# Patient Record
Sex: FEMALE | Race: OTHER | Employment: OTHER | URBAN - METROPOLITAN AREA
[De-identification: names, ages, dates, MRNs, and addresses within clinical notes are randomized per-mention and may not be internally consistent; named-entity substitution may affect disease eponyms.]

---

## 2018-06-13 PROBLEM — E78.49 OTHER HYPERLIPIDEMIA: Status: ACTIVE | Noted: 2018-06-13

## 2018-06-13 PROBLEM — F32.4 MAJOR DEPRESSIVE DISORDER IN PARTIAL REMISSION, UNSPECIFIED WHETHER RECURRENT: Status: ACTIVE | Noted: 2018-06-13

## 2018-06-13 PROBLEM — Z98.890 HISTORY OF CARDIAC CATH: Status: ACTIVE | Noted: 2018-06-13

## 2018-06-13 PROBLEM — F41.1 GAD (GENERALIZED ANXIETY DISORDER): Status: ACTIVE | Noted: 2018-06-13

## 2018-06-13 PROBLEM — I25.10 ASHD (ARTERIOSCLEROTIC HEART DISEASE): Status: ACTIVE | Noted: 2018-06-13

## 2018-06-13 PROBLEM — F32.4 MAJOR DEPRESSIVE DISORDER IN PARTIAL REMISSION, UNSPECIFIED WHETHER RECURRENT (HCC): Status: ACTIVE | Noted: 2018-06-13

## 2018-06-13 PROBLEM — R73.03 PREDIABETES: Status: ACTIVE | Noted: 2018-06-13

## 2018-06-25 ENCOUNTER — OFFICE VISIT (OUTPATIENT)
Dept: FAMILY MEDICINE CLINIC | Facility: CLINIC | Age: 73
End: 2018-06-25

## 2018-06-25 VITALS
HEART RATE: 44 BPM | SYSTOLIC BLOOD PRESSURE: 153 MMHG | HEIGHT: 63 IN | WEIGHT: 182 LBS | DIASTOLIC BLOOD PRESSURE: 67 MMHG | TEMPERATURE: 98 F | BODY MASS INDEX: 32.25 KG/M2

## 2018-06-25 DIAGNOSIS — K64.4 HEMORRHOIDS, EXTERNAL: ICD-10-CM

## 2018-06-25 DIAGNOSIS — I50.9 CHRONIC CONGESTIVE HEART FAILURE, UNSPECIFIED HEART FAILURE TYPE (HCC): ICD-10-CM

## 2018-06-25 DIAGNOSIS — I25.10 CORONARY ARTERY DISEASE WITHOUT ANGINA PECTORIS, UNSPECIFIED VESSEL OR LESION TYPE, UNSPECIFIED WHETHER NATIVE OR TRANSPLANTED HEART: Primary | ICD-10-CM

## 2018-06-25 DIAGNOSIS — Z12.11 COLON CANCER SCREENING: ICD-10-CM

## 2018-06-25 DIAGNOSIS — R73.03 PREDIABETES: ICD-10-CM

## 2018-06-25 PROCEDURE — 99203 OFFICE O/P NEW LOW 30 MIN: CPT | Performed by: FAMILY MEDICINE

## 2018-06-25 PROCEDURE — 99212 OFFICE O/P EST SF 10 MIN: CPT | Performed by: FAMILY MEDICINE

## 2018-06-25 RX ORDER — FUROSEMIDE 20 MG/1
20 TABLET ORAL 2 TIMES DAILY
Qty: 30 TABLET | Refills: 1 | Status: SHIPPED | OUTPATIENT
Start: 2018-06-25 | End: 2021-05-14

## 2018-06-25 NOTE — PROGRESS NOTES
6/25/2018  3:10 PM    Jericho Lewis is a 68year old female. Chief complaint(s): Patient presents with:  Anal Problem (GI)    HPI:     Jericho Lewis primary complaint is regarding multiple complaints. Patient presents with CAD.   she is here toda Medications (Active prior to today's visit):    Current Outpatient Prescriptions:  hydrocortisone (ANUSOL-HC) 2.5 % Rectal Cream Place 1 Application rectally 2 (two) times daily.  Disp: 30 g Rfl: 1   Psyllium 58.6 % Oral Powder Take 2 tablespoons in 6 o with a grade of 2/6   Pulmonary/Chest: Effort normal and breath sounds normal. She has no wheezes. She has no rales. Abdominal: Soft. Normal appearance and bowel sounds are normal. There is no tenderness.    Genitourinary: Rectal exam shows internal hemor Disp: , Rfl:            LABORATORY & ORDERS:   Orders Placed This Encounter      **CMP  Comp Metabolic Panel (14) [E]      **CBC W Differential W Platelet [E]      TSH W Reflex To Free T4 [E]    REFERRALS: 1GASTRO - INTERNAL  CARD ECHO 2D DOPPLER CONTRAST This Visit:    Signed Prescriptions Disp Refills    hydrocortisone (ANUSOL-HC) 2.5 % Rectal Cream 30 g 1      Sig: Place 1 Application rectally 2 (two) times daily.       Psyllium 58.6 % Oral Powder 660 g 7      Sig: Take 2 tablespoons in 6 oz of water at n

## 2018-06-29 ENCOUNTER — TELEPHONE (OUTPATIENT)
Dept: FAMILY MEDICINE CLINIC | Facility: CLINIC | Age: 73
End: 2018-06-29

## 2018-06-29 NOTE — TELEPHONE ENCOUNTER
Dr Saima Burgos see pt refill request below. Metoprolol is listed as historical on MAR and \"opendas\" \"Idapatan\" are not listed on MAR. Please advise.

## 2018-06-29 NOTE — TELEPHONE ENCOUNTER
Pt called requesting refill on medications that she received in [place]      Current Outpatient Prescriptions:  Metoprolol Tartrate 100 MG Oral Tab Take 100 mg by mouth daily.  Disp:  Rfl:        opendas 40mg  idapatan 35mg

## 2018-07-01 RX ORDER — METOPROLOL TARTRATE 100 MG/1
100 TABLET ORAL DAILY
Qty: 90 TABLET | Refills: 2 | Status: SHIPPED | OUTPATIENT
Start: 2018-07-01 | End: 2020-10-15

## 2020-09-23 ENCOUNTER — NURSE TRIAGE (OUTPATIENT)
Dept: FAMILY MEDICINE CLINIC | Facility: CLINIC | Age: 75
End: 2020-09-23

## 2020-09-23 NOTE — TELEPHONE ENCOUNTER
Action Requested: Summary for Provider     []  Critical Lab, Recommendations Needed  [] Need Additional Advice  []   FYI    []   Need Orders  [] Need Medications Sent to Pharmacy  []  Other     SUMMARY: language line #[phone] assisted with the phone call.

## 2020-09-28 ENCOUNTER — TELEPHONE (OUTPATIENT)
Dept: FAMILY MEDICINE CLINIC | Facility: CLINIC | Age: 75
End: 2020-09-28

## 2020-09-28 NOTE — TELEPHONE ENCOUNTER
Granddaughter states redness on skin near Ostomy site and wanting to schedule an appointment with Dr. Asuncion Verdugo.   Scheduled patient for tomorrow 9/29 at 10:00 am.

## 2020-09-29 ENCOUNTER — OFFICE VISIT (OUTPATIENT)
Dept: FAMILY MEDICINE CLINIC | Facility: CLINIC | Age: 75
End: 2020-09-29
Payer: COMMERCIAL

## 2020-09-29 VITALS
SYSTOLIC BLOOD PRESSURE: 149 MMHG | DIASTOLIC BLOOD PRESSURE: 69 MMHG | BODY MASS INDEX: 29.77 KG/M2 | TEMPERATURE: 97 F | HEIGHT: 63 IN | HEART RATE: 61 BPM | WEIGHT: 168 LBS

## 2020-09-29 DIAGNOSIS — Z85.048 HISTORY OF RECTAL CANCER: ICD-10-CM

## 2020-09-29 DIAGNOSIS — N39.0 RECURRENT UTI: ICD-10-CM

## 2020-09-29 DIAGNOSIS — Z43.3 COLOSTOMY CARE (HCC): ICD-10-CM

## 2020-09-29 DIAGNOSIS — R30.0 DYSURIA: ICD-10-CM

## 2020-09-29 DIAGNOSIS — Z76.89 ENCOUNTER TO ESTABLISH CARE: Primary | ICD-10-CM

## 2020-09-29 PROBLEM — R73.03 PREDIABETES: Status: RESOLVED | Noted: 2018-06-13 | Resolved: 2020-09-29

## 2020-09-29 PROBLEM — I25.119 CORONARY ARTERY DISEASE INVOLVING NATIVE CORONARY ARTERY OF NATIVE HEART WITH ANGINA PECTORIS (HCC): Status: ACTIVE | Noted: 2018-06-13

## 2020-09-29 PROBLEM — I25.119 CORONARY ARTERY DISEASE INVOLVING NATIVE CORONARY ARTERY OF NATIVE HEART WITH ANGINA PECTORIS: Status: ACTIVE | Noted: 2018-06-13

## 2020-09-29 PROCEDURE — 99214 OFFICE O/P EST MOD 30 MIN: CPT | Performed by: FAMILY MEDICINE

## 2020-09-29 PROCEDURE — G0463 HOSPITAL OUTPT CLINIC VISIT: HCPCS | Performed by: FAMILY MEDICINE

## 2020-09-29 PROCEDURE — 3008F BODY MASS INDEX DOCD: CPT | Performed by: FAMILY MEDICINE

## 2020-09-29 PROCEDURE — 3078F DIAST BP <80 MM HG: CPT | Performed by: FAMILY MEDICINE

## 2020-09-29 PROCEDURE — 3077F SYST BP >= 140 MM HG: CPT | Performed by: FAMILY MEDICINE

## 2020-09-29 PROCEDURE — 81002 URINALYSIS NONAUTO W/O SCOPE: CPT | Performed by: FAMILY MEDICINE

## 2020-09-29 RX ORDER — KARAYA GUM
POWDER (GRAM) TOPICAL
Refills: 0 | Status: CANCELLED | OUTPATIENT
Start: 2020-09-29

## 2020-09-29 RX ORDER — LOSARTAN POTASSIUM 50 MG/1
TABLET ORAL DAILY
COMMUNITY
End: 2020-10-20

## 2020-10-01 PROBLEM — N39.0 RECURRENT UTI: Status: ACTIVE | Noted: 2020-10-01

## 2020-10-01 PROBLEM — Z85.048 HISTORY OF RECTAL CANCER: Status: ACTIVE | Noted: 2020-10-01

## 2020-10-01 PROBLEM — Z43.3 COLOSTOMY CARE (HCC): Status: ACTIVE | Noted: 2020-10-01

## 2020-10-01 RX ORDER — NITROFURANTOIN 25; 75 MG/1; MG/1
100 CAPSULE ORAL 2 TIMES DAILY
Qty: 10 CAPSULE | Refills: 0 | Status: SHIPPED | OUTPATIENT
Start: 2020-10-01 | End: 2020-10-01

## 2020-10-01 RX ORDER — CIPROFLOXACIN 500 MG/1
500 TABLET, FILM COATED ORAL 2 TIMES DAILY
Qty: 14 TABLET | Refills: 0 | Status: SHIPPED | OUTPATIENT
Start: 2020-10-01 | End: 2020-10-08

## 2020-10-01 NOTE — PROGRESS NOTES
HPI:   Giovanni Neal is a 76year old female who presents for an establish care visit. Patient recently moved to [facility] from [facility] where she was living with her son.   Her son passed away recently and had to move back to [facility] to  Hemorrhoid    • History of cardiac cath 6/13/2018   • Hyperlipidemia       Past Surgical History:   Procedure Laterality Date   • ANGIOPLASTY (CORONARY)     • CATARACT     • HYSTERECTOMY        History reviewed. No pertinent family history.    Social Histor infections. - OP REFERRAL TO UROGYNECOLOGY CLINIC    4.  Colostomy care [facility])  - -Patient was getting ostomy supplies, lubricating deodorant, barrier, cohesive stoma, stoma powder, drainable pouches with filter and adhesive remover wipes from [facility]

## 2020-10-12 ENCOUNTER — TELEPHONE (OUTPATIENT)
Dept: FAMILY MEDICINE CLINIC | Facility: CLINIC | Age: 75
End: 2020-10-12

## 2020-10-12 DIAGNOSIS — N39.0 RECURRENT UTI: Primary | ICD-10-CM

## 2020-10-12 NOTE — TELEPHONE ENCOUNTER
Bahamian speaking - pt said that referral for urogynecologist Inocente Brower does not take her insurance.  Pt requests another referral to see . Please advise

## 2020-10-15 ENCOUNTER — OFFICE VISIT (OUTPATIENT)
Dept: FAMILY MEDICINE CLINIC | Facility: CLINIC | Age: 75
End: 2020-10-15
Payer: MEDICARE

## 2020-10-15 ENCOUNTER — LAB ENCOUNTER (OUTPATIENT)
Dept: LAB | Age: 75
End: 2020-10-15
Attending: FAMILY MEDICINE
Payer: MEDICARE

## 2020-10-15 VITALS
HEIGHT: 63 IN | HEART RATE: 60 BPM | RESPIRATION RATE: 18 BRPM | TEMPERATURE: 98 F | BODY MASS INDEX: 29.41 KG/M2 | SYSTOLIC BLOOD PRESSURE: 136 MMHG | DIASTOLIC BLOOD PRESSURE: 78 MMHG | WEIGHT: 166 LBS

## 2020-10-15 DIAGNOSIS — Z85.038 HISTORY OF COLON CANCER: Primary | ICD-10-CM

## 2020-10-15 DIAGNOSIS — R10.84 GENERALIZED ABDOMINAL PAIN: ICD-10-CM

## 2020-10-15 DIAGNOSIS — I10 ESSENTIAL HYPERTENSION: ICD-10-CM

## 2020-10-15 DIAGNOSIS — Z85.038 HISTORY OF COLON CANCER: ICD-10-CM

## 2020-10-15 DIAGNOSIS — N30.00 ACUTE CYSTITIS WITHOUT HEMATURIA: ICD-10-CM

## 2020-10-15 PROCEDURE — 99213 OFFICE O/P EST LOW 20 MIN: CPT | Performed by: FAMILY MEDICINE

## 2020-10-15 PROCEDURE — 80053 COMPREHEN METABOLIC PANEL: CPT

## 2020-10-15 PROCEDURE — 3078F DIAST BP <80 MM HG: CPT | Performed by: FAMILY MEDICINE

## 2020-10-15 PROCEDURE — 81003 URINALYSIS AUTO W/O SCOPE: CPT | Performed by: FAMILY MEDICINE

## 2020-10-15 PROCEDURE — 3075F SYST BP GE 130 - 139MM HG: CPT | Performed by: FAMILY MEDICINE

## 2020-10-15 PROCEDURE — 3008F BODY MASS INDEX DOCD: CPT | Performed by: FAMILY MEDICINE

## 2020-10-15 PROCEDURE — 82378 CARCINOEMBRYONIC ANTIGEN: CPT | Performed by: FAMILY MEDICINE

## 2020-10-15 PROCEDURE — 85025 COMPLETE CBC W/AUTO DIFF WBC: CPT

## 2020-10-15 PROCEDURE — 36415 COLL VENOUS BLD VENIPUNCTURE: CPT

## 2020-10-15 PROCEDURE — G0463 HOSPITAL OUTPT CLINIC VISIT: HCPCS | Performed by: FAMILY MEDICINE

## 2020-10-15 NOTE — PROGRESS NOTES
10/15/2020  1:27 PM    Aldon Riedel is a 76year old female. Chief complaint(s): Patient presents with: Follow - Up: abd pain. states has improved very mildly. worse after earing.     HPI:     Aldon Riedel primary complaint is regarding multiple c • hydrocortisone (ANUSOL-HC) 2.5 % Rectal Cream Place 1 Application rectally 2 (two) times daily. (Patient not taking: Reported on 9/29/2020 ) 30 g 1   • Psyllium 58.6 % Oral Powder Take 2 tablespoons in 6 oz of water at nightly.  (Patient not taking: Repor Urobilinogen Urine 0.2 0.0 - 1.9 mg/dL    Nitrite Urine Neg Negative    Leukocyte Esterase Urine Neg Negative    APPEARANCE clear Clear    Color Urine yellow Yellow    Multistix Lot# [id number] Numeric    Multistix Expiration Date 6/30/21 Date       EKG / Spir

## 2020-10-15 NOTE — TELEPHONE ENCOUNTER
Please inform patient that I placed a referral to urogynecology that appears to be within network, can provide with referral information.

## 2020-10-19 ENCOUNTER — TELEPHONE (OUTPATIENT)
Dept: FAMILY MEDICINE CLINIC | Facility: CLINIC | Age: 75
End: 2020-10-19

## 2020-10-19 DIAGNOSIS — I10 ESSENTIAL HYPERTENSION: Primary | ICD-10-CM

## 2020-10-19 NOTE — TELEPHONE ENCOUNTER
Patient is requesting a refill for losartan 50mg. Per patient, medication was being prescribed by her previous doctor in [place]. Please advise.

## 2020-10-20 ENCOUNTER — TELEPHONE (OUTPATIENT)
Dept: FAMILY MEDICINE CLINIC | Facility: CLINIC | Age: 75
End: 2020-10-20

## 2020-10-20 RX ORDER — LOSARTAN POTASSIUM 50 MG/1
50 TABLET ORAL DAILY
Qty: 90 TABLET | Refills: 1 | Status: SHIPPED | OUTPATIENT
Start: 2020-10-20 | End: 2020-10-29

## 2020-10-21 NOTE — TELEPHONE ENCOUNTER
Message # [phone]         10/20/2020 09:02p   [TABATHAP]  To:  From:  DIAZ Sarkar MD:  Phone#:    Vicky Vega, [address],  [address], RE PT NOT FEELING WELL, ONLY SPEAKS Nauruan Paged at number :  PAGE:  [phone] at :  [facility] [employer].

## 2020-10-22 ENCOUNTER — HOSPITAL ENCOUNTER (OUTPATIENT)
Dept: CT IMAGING | Facility: HOSPITAL | Age: 75
Discharge: HOME | End: 2020-10-22
Attending: FAMILY MEDICINE
Payer: MEDICARE

## 2020-10-22 DIAGNOSIS — R10.84 GENERALIZED ABDOMINAL PAIN: ICD-10-CM

## 2020-10-22 DIAGNOSIS — Z85.038 HISTORY OF COLON CANCER: ICD-10-CM

## 2020-10-22 PROCEDURE — 74177 CT ABD & PELVIS W/CONTRAST: CPT | Performed by: FAMILY MEDICINE

## 2020-10-29 ENCOUNTER — OFFICE VISIT (OUTPATIENT)
Dept: FAMILY MEDICINE CLINIC | Facility: CLINIC | Age: 75
End: 2020-10-29
Payer: MEDICAID

## 2020-10-29 ENCOUNTER — TELEPHONE (OUTPATIENT)
Dept: FAMILY MEDICINE CLINIC | Facility: CLINIC | Age: 75
End: 2020-10-29

## 2020-10-29 VITALS
SYSTOLIC BLOOD PRESSURE: 152 MMHG | HEART RATE: 73 BPM | HEIGHT: 63 IN | DIASTOLIC BLOOD PRESSURE: 83 MMHG | WEIGHT: 166 LBS | BODY MASS INDEX: 29.41 KG/M2

## 2020-10-29 DIAGNOSIS — K57.90 DIVERTICULOSIS: ICD-10-CM

## 2020-10-29 DIAGNOSIS — I10 ESSENTIAL HYPERTENSION: ICD-10-CM

## 2020-10-29 DIAGNOSIS — Z85.038 HISTORY OF COLON CANCER: Primary | ICD-10-CM

## 2020-10-29 DIAGNOSIS — N95.2 VAGINAL ATROPHY: ICD-10-CM

## 2020-10-29 DIAGNOSIS — K86.2 PANCREATIC CYST: ICD-10-CM

## 2020-10-29 PROCEDURE — 3077F SYST BP >= 140 MM HG: CPT | Performed by: FAMILY MEDICINE

## 2020-10-29 PROCEDURE — 90662 IIV NO PRSV INCREASED AG IM: CPT | Performed by: FAMILY MEDICINE

## 2020-10-29 PROCEDURE — 3008F BODY MASS INDEX DOCD: CPT | Performed by: FAMILY MEDICINE

## 2020-10-29 PROCEDURE — G0008 ADMIN INFLUENZA VIRUS VAC: HCPCS | Performed by: FAMILY MEDICINE

## 2020-10-29 PROCEDURE — 3079F DIAST BP 80-89 MM HG: CPT | Performed by: FAMILY MEDICINE

## 2020-10-29 PROCEDURE — 99213 OFFICE O/P EST LOW 20 MIN: CPT | Performed by: FAMILY MEDICINE

## 2020-10-29 RX ORDER — ESTRADIOL 0.1 MG/G
1 CREAM VAGINAL DAILY
Qty: 42 G | Refills: 2 | Status: SHIPPED | OUTPATIENT
Start: 2020-10-29 | End: 2020-10-29

## 2020-10-29 RX ORDER — LOSARTAN POTASSIUM 100 MG/1
100 TABLET ORAL DAILY
Qty: 90 TABLET | Refills: 1 | Status: SHIPPED | OUTPATIENT
Start: 2020-10-29

## 2020-10-29 NOTE — PROGRESS NOTES
10/29/2020  11:42 AM    Sukhdeep Conteh is a 76year old female. Chief complaint(s): Patient presents with:  Burn: vaginal burning sensation  Hx colon cancer  HPI:     Sukhdeep Conteh primary complaint is regarding as above.      Patient is a 76 -year-ol 23          01/30/2013      Medications (Active prior to today's visit):  Current Outpatient Medications   Medication Sig Dispense Refill   • Estradiol (ESTRACE) 0.1 MG/GM Vaginal Cream Place 1 g vaginally daily.  42 g 2   • losartan Potassium 100 MG Oral T Calculated Osmolality 291 275 - 295 mOsm/kg    GFR, Non- 66 >=60    GFR, -American 76 >=60    ALT 15 13 - 56 U/L    AST 16 15 - 37 U/L    Alkaline Phosphatase 49 (L) 55 - 142 U/L    Bilirubin, Total 0.7 0.1 - 2.0 mg/dL    Total Prote heart is normal in size. Coronary artery calcification. Trace bilateral pleural effusions. There is dependent subsegmental atelectasis bilaterally with elevation of the left hemidiaphragm.  LIVER: No enlargement, atrophy, abnormal density, or significant carcinoma. Postsurgical changes of abdominoperineal resection are identified with a left lower quadrant colostomy. Slight nodularity in the presacral space is most likely postprocedural in nature. Moderate colonic fecal burden suggests constipation.   Mi 1 tablet (100 mg total) by mouth daily. LABORATORY & ORDERS: Orders Placed This Encounter      Influenza, High-Dose (Fluzone) [id number]     RECOMMENDATIONS given include: Please, call our office with any questions or concerns.  Notify Dr Malathi Whitaker or

## 2020-10-29 NOTE — TELEPHONE ENCOUNTER
Pharmacy calling and requesting Vaginal Cream clarification . States that this is not for long term use, states that usually once a day for just 2 weeks. Dr Fatou Kellogg above and advise. Pended new prescription for approval good for 2 weeks only.

## 2020-10-30 RX ORDER — ESTRADIOL 0.1 MG/G
1 CREAM VAGINAL
Qty: 1 TUBE | Refills: 0 | Status: SHIPPED | OUTPATIENT
Start: 2020-10-30 | End: 2020-11-13

## 2020-11-08 NOTE — H&P
[address] [facility] [address] Gastroenterology                                                                                                               Reason for consult: h/o colon ca    Requesting physician or provider: Henrique Celaya dilated side branch radical.  Suggest follow-up MRCP for further evaluation.     No jaundice, pruritus, tarik colored stool, dark urine    Labs 10/15/20:  CEA 1.3  CBC not anemic  LFTs nml      NSAIDS:  Tobacco:  Alcohol:  Illicit drugs:    No FH GI malignan Allergies    ROS:   CONSTITUTIONAL: negative for fevers, chills, sweats and weight loss  EYES Negative for red eyes, yellow eyes, changes in vision  HEENT: Negative for dysphagia and hoarseness  RESPIRATORY: Negative for cough and shortness of breath  CARD Eosinophil Absolute 0.03 0.00 - 0.70 x10(3) uL    Basophil Absolute 0.03 0.00 - 0.20 x10(3) uL    Immature Granulocyte Absolute 0.01 0.00 - 1.00 x10(3) uL    Neutrophil % 52.7 %    Lymphocyte % 37.2 %    Monocyte % 8.5 %    Eosinophil % 0.7 %    Basophil % dilated side branch radical. No ductal dilatation. Last lfts nml 10/15/20. She reports various new onset upper gi complaints including epigastric pain that started approx feb and after the death of her son. Symptoms seemingly improved since starting ppi.

## 2020-11-10 ENCOUNTER — OFFICE VISIT (OUTPATIENT)
Dept: GASTROENTEROLOGY | Facility: CLINIC | Age: 75
End: 2020-11-10
Payer: MEDICARE

## 2020-11-10 VITALS
HEIGHT: 63 IN | TEMPERATURE: 98 F | BODY MASS INDEX: 30.3 KG/M2 | WEIGHT: 171 LBS | SYSTOLIC BLOOD PRESSURE: 130 MMHG | DIASTOLIC BLOOD PRESSURE: 88 MMHG | HEART RATE: 76 BPM

## 2020-11-10 DIAGNOSIS — K21.9 GASTROESOPHAGEAL REFLUX DISEASE, UNSPECIFIED WHETHER ESOPHAGITIS PRESENT: ICD-10-CM

## 2020-11-10 DIAGNOSIS — C20 RECTAL CANCER (HCC): ICD-10-CM

## 2020-11-10 DIAGNOSIS — R11.0 NAUSEA: ICD-10-CM

## 2020-11-10 DIAGNOSIS — K86.9 PANCREATIC LESION: Primary | ICD-10-CM

## 2020-11-10 DIAGNOSIS — R10.13 EPIGASTRIC PAIN: ICD-10-CM

## 2020-11-10 DIAGNOSIS — R14.0 BLOATING: ICD-10-CM

## 2020-11-10 PROCEDURE — 3079F DIAST BP 80-89 MM HG: CPT | Performed by: NURSE PRACTITIONER

## 2020-11-10 PROCEDURE — 99214 OFFICE O/P EST MOD 30 MIN: CPT | Performed by: NURSE PRACTITIONER

## 2020-11-10 PROCEDURE — 3075F SYST BP GE 130 - 139MM HG: CPT | Performed by: NURSE PRACTITIONER

## 2020-11-10 PROCEDURE — 3008F BODY MASS INDEX DOCD: CPT | Performed by: NURSE PRACTITIONER

## 2020-11-10 NOTE — PATIENT INSTRUCTIONS
-mri  -continue pantoprazole 40 mg/daily    -you will need a colonoscopy thought your stoma and depending on the results of the mri likely an egd and/or egd/eus to evalaute your upper gi system and pancreas if needed

## 2020-11-13 ENCOUNTER — OFFICE VISIT (OUTPATIENT)
Dept: FAMILY MEDICINE CLINIC | Facility: CLINIC | Age: 75
End: 2020-11-13
Payer: MEDICARE

## 2020-11-13 VITALS
DIASTOLIC BLOOD PRESSURE: 100 MMHG | BODY MASS INDEX: 29.59 KG/M2 | HEIGHT: 63 IN | HEART RATE: 79 BPM | SYSTOLIC BLOOD PRESSURE: 150 MMHG | WEIGHT: 167 LBS

## 2020-11-13 DIAGNOSIS — Z85.038 HISTORY OF COLON CANCER: Primary | ICD-10-CM

## 2020-11-13 DIAGNOSIS — K86.2 PANCREATIC CYST: ICD-10-CM

## 2020-11-13 DIAGNOSIS — N76.0 ACUTE VAGINITIS: ICD-10-CM

## 2020-11-13 PROBLEM — I50.9 CHRONIC CONGESTIVE HEART FAILURE (HCC): Status: ACTIVE | Noted: 2020-11-13

## 2020-11-13 PROCEDURE — 3077F SYST BP >= 140 MM HG: CPT | Performed by: FAMILY MEDICINE

## 2020-11-13 PROCEDURE — 3080F DIAST BP >= 90 MM HG: CPT | Performed by: FAMILY MEDICINE

## 2020-11-13 PROCEDURE — 3008F BODY MASS INDEX DOCD: CPT | Performed by: FAMILY MEDICINE

## 2020-11-13 PROCEDURE — 99213 OFFICE O/P EST LOW 20 MIN: CPT | Performed by: FAMILY MEDICINE

## 2020-11-13 RX ORDER — FLUCONAZOLE 100 MG/1
100 TABLET ORAL DAILY
Qty: 10 TABLET | Refills: 0 | Status: SHIPPED | OUTPATIENT
Start: 2020-11-13 | End: 2021-05-14

## 2020-11-13 RX ORDER — PANTOPRAZOLE SODIUM 40 MG/1
40 TABLET, DELAYED RELEASE ORAL
COMMUNITY
End: 2021-04-23

## 2020-11-13 NOTE — PROGRESS NOTES
11/13/2020  11:19 AM    Feng Wilson is a 76year old female. Chief complaint(s): Patient presents with: Follow - Up  Other: started taking pantoprazole     HPI:     Feng Wilson primary complaint is regarding multiple complaints.      Patient is • Terconazole 0.8 % Vaginal Cream Place 1 applicator vaginally nightly for 3 days. 1 Tube 0   • Estradiol (ESTRACE) 0.1 MG/GM Vaginal Cream Place 1 g vaginally daily as needed.  Use for 2 weeks only 1 Tube 0   • losartan Potassium 100 MG Oral Tab Take 1 tab PROCEDURE: CT ABDOMEN + PELVIS (ALL CONTRAST ONLY) (CPT=74160/68298)  COMPARISON: None. INDICATIONS: History of colon cancer, s/p surgery with colostomy in [place]-2018. Patient c/o intermittent diffuse pain with  constipation.   TECHNIQUE: Multidetector C Pelvic floor laxity is suspected with a small cystocele. VASCULATURE:   No aneurysm is detected. Mild atherosclerosis. RETROPERITONEUM: No mass or lymphadenopathy is apparent.   BONES:   Demineralization with moderate lumbar dextroscoliosis and multilevel 1. History of colon cancer  2. Pancreatic cyst  CPM  Follow with GI to complete work up  RTC after colonoscopy, MRI    3.  Acute vaginitis    MEDICATIONS:     Requested Prescriptions     Signed Prescriptions Disp Refills   • fluconazole (DIFLUCAN) 100 MG Or

## 2020-11-23 ENCOUNTER — TELEPHONE (OUTPATIENT)
Dept: GASTROENTEROLOGY | Facility: CLINIC | Age: 75
End: 2020-11-23

## 2020-11-23 NOTE — TELEPHONE ENCOUNTER
APN contacted pt to follow-up after clinic visit. MRI does not appear to be scheduled yet and needed to determine next steps. Lmtcb.

## 2020-11-30 NOTE — TELEPHONE ENCOUNTER
Pt contacted to follow-up. Used  for call. Needs cln +/- egd vs egd/eus pending mrcp results and imaging not yet scheduled. Again left message for pt to call back.

## 2020-12-14 NOTE — TELEPHONE ENCOUNTER
Attempted to contact using . Spoke with patients son. Patient is in [place] until January. apn explained that pt is in need of mri and colon and testing should be done as soon as possible.   He will let patient know and she will to schedule mri

## 2021-01-27 DIAGNOSIS — Z23 NEED FOR VACCINATION: ICD-10-CM

## 2021-01-28 ENCOUNTER — TELEPHONE (OUTPATIENT)
Dept: FAMILY MEDICINE CLINIC | Facility: CLINIC | Age: 76
End: 2021-01-28

## 2021-01-28 NOTE — TELEPHONE ENCOUNTER
Karon from [facility] called with questions about the referral. She is confused what exactly the home health is for and why it is needed. Please advise. #[phone]. She also mentions they may need the patient to see PCP before.

## 2021-01-28 NOTE — TELEPHONE ENCOUNTER
Lila Martinez states they did get the referral they can start the RN service right away but the Occupational therapy it will be a delayed until February 8 is that ok.         Verbal response is ok

## 2021-02-04 ENCOUNTER — PATIENT MESSAGE (OUTPATIENT)
Dept: CASE MANAGEMENT | Age: 76
End: 2021-02-04

## 2021-02-04 DIAGNOSIS — C18.7 MALIGNANT NEOPLASM OF SIGMOID COLON (HCC): Primary | ICD-10-CM

## 2021-02-05 NOTE — TELEPHONE ENCOUNTER
Karon calling to f/u on status of referral. Stated that she has been waiting for progress notes and order for this pt. Please advise.  Karon marroquin/adelfo #[phone]

## 2021-02-08 ENCOUNTER — TELEPHONE (OUTPATIENT)
Dept: FAMILY MEDICINE CLINIC | Facility: CLINIC | Age: 76
End: 2021-02-08

## 2021-02-08 DIAGNOSIS — Z85.038 HISTORY OF COLON CANCER: Primary | ICD-10-CM

## 2021-02-08 NOTE — TELEPHONE ENCOUNTER
Karon/ Intake Nurse of [facility] is calling regarding referral for home health for nursing and occupational therapy from Dr. Allan Deleon. Karon states diagnosis UTI is not considered valid.  Miriam Martinez also states it is unclear on what skilled need is ne

## 2021-02-08 NOTE — TELEPHONE ENCOUNTER
Dr. Juan Andre do you want home health? If so a new referral is need  1)  Why has the colostomy   Due to ? 2)   Why is she having issues. A new office is also needed for has to be within 90 days. Please also see 1/28/21 encounter.

## 2021-02-08 NOTE — TELEPHONE ENCOUNTER
Can inform [facility] that the referral for home health nurse was for assistance with her stoma care patient with a colostomy and was having issues when I last saw her over 90 days ago. Can follow-up with her PCP if still needed.

## 2021-02-09 NOTE — TELEPHONE ENCOUNTER
Dr. Nae Elkins, Located within [facility] order has been pended. Please review and sign off.

## 2021-02-10 NOTE — TELEPHONE ENCOUNTER
Karon from [facility] returning call. Per Jannet Hernandez the patient needs office visit to address the rectal/ colon cancer as she needs to be seen within 90 days. Whomever the patient see's has to be the one to write the order for home health.  As far as the diagnosis

## 2021-02-10 NOTE — TELEPHONE ENCOUNTER
Patients son, Gilbert Graham on the line. Patient is with her. Gilbert Graham is returning call to verify they are in [facility].  They will contact office when they return next month.     ZACHARY

## 2021-02-16 NOTE — TELEPHONE ENCOUNTER
Karon called to follow up. States she will be discharging patient for now. If patient needs services new orders will be needed.

## 2021-03-01 NOTE — TELEPHONE ENCOUNTER
Nursing:  Patient has not followed up and/or had recommended testing. Can we send her a letter?   ThanksToro

## 2021-03-01 NOTE — TELEPHONE ENCOUNTER
Generated no response letter sent to patient via [employer] and mailed (Regarding MRI MRCP that was never completed).

## 2021-04-02 ENCOUNTER — TELEPHONE (OUTPATIENT)
Dept: CASE MANAGEMENT | Age: 76
End: 2021-04-02

## 2021-04-02 ENCOUNTER — TELEPHONE (OUTPATIENT)
Dept: GASTROENTEROLOGY | Facility: CLINIC | Age: 76
End: 2021-04-02

## 2021-04-02 NOTE — TELEPHONE ENCOUNTER
Patient is eligible for a 2021 Medicare Annual Wellness visit. Language Line  Radha [phone] left msg to call back.

## 2021-04-13 ENCOUNTER — OFFICE VISIT (OUTPATIENT)
Dept: FAMILY MEDICINE CLINIC | Facility: CLINIC | Age: 76
End: 2021-04-13
Payer: MEDICARE

## 2021-04-13 VITALS
HEIGHT: 63 IN | TEMPERATURE: 97 F | HEART RATE: 64 BPM | SYSTOLIC BLOOD PRESSURE: 143 MMHG | DIASTOLIC BLOOD PRESSURE: 92 MMHG | RESPIRATION RATE: 18 BRPM | BODY MASS INDEX: 29.68 KG/M2 | WEIGHT: 167.5 LBS

## 2021-04-13 DIAGNOSIS — F32.1 CURRENT MODERATE EPISODE OF MAJOR DEPRESSIVE DISORDER, UNSPECIFIED WHETHER RECURRENT (HCC): ICD-10-CM

## 2021-04-13 DIAGNOSIS — Z85.038 HISTORY OF COLON CANCER: ICD-10-CM

## 2021-04-13 DIAGNOSIS — C20 RECTAL CANCER (HCC): Primary | ICD-10-CM

## 2021-04-13 PROCEDURE — 99213 OFFICE O/P EST LOW 20 MIN: CPT | Performed by: FAMILY MEDICINE

## 2021-04-13 PROCEDURE — 3077F SYST BP >= 140 MM HG: CPT | Performed by: FAMILY MEDICINE

## 2021-04-13 PROCEDURE — 3080F DIAST BP >= 90 MM HG: CPT | Performed by: FAMILY MEDICINE

## 2021-04-13 PROCEDURE — 3008F BODY MASS INDEX DOCD: CPT | Performed by: FAMILY MEDICINE

## 2021-04-13 RX ORDER — CITALOPRAM 20 MG/1
20 TABLET ORAL DAILY
Qty: 90 TABLET | Refills: 0 | Status: SHIPPED | OUTPATIENT
Start: 2021-04-13

## 2021-04-13 NOTE — PROGRESS NOTES
4/13/2021  1:02 PM    Mali Carr is a 68year old female. Chief complaint(s): Patient presents with:  Abdominal Pain: Pain and bloating by colostomy incision. HPI:     Mali Carr primary complaint is regarding Hx colon cancer.      Patient • ASPIRIN 81 OR Take by mouth. Taking every 3 days     • Citalopram Hydrobromide 20 MG Oral Tab Take 1 tablet (20 mg total) by mouth daily.  90 tablet 0   • Pantoprazole Sodium 40 MG Oral Tab EC Take 40 mg by mouth every morning before breakfast.     • lo Musculoskeletal:      Cervical back: Neck supple. Skin:     Findings: No rash. Psychiatric:         Mood and Affect: Mood is depressed. LABORATORY RESULTS:     EKG / Spirometry : -     Radiology: No results found.      ASSESSMENT/PLAN:   Assessm

## 2021-04-15 ENCOUNTER — TELEPHONE (OUTPATIENT)
Dept: FAMILY MEDICINE CLINIC | Facility: CLINIC | Age: 76
End: 2021-04-15

## 2021-04-15 NOTE — TELEPHONE ENCOUNTER
Patient is calling regarding a referral for gastroenterologist, Dr. Carleen Gordon. Dr. Cheo Hernandez does not have availability until next month and was given the option to schedule with a Nurse Practitioner on 5/17/21.     She took the appointment with Nurse

## 2021-04-16 ENCOUNTER — TELEPHONE (OUTPATIENT)
Dept: GASTROENTEROLOGY | Facility: CLINIC | Age: 76
End: 2021-04-16

## 2021-04-16 NOTE — TELEPHONE ENCOUNTER
D/w Dr. Benedicto Steward - patient very interested in getting colostomy reversed. Will need to see me to discuss c-scope via ostomy. Needs MRI as well as Sarah Beth Meeks ordered.     GI Clinical Staff:   Can you call patient, and have her see me next week 4/23 Fri

## 2021-04-16 NOTE — TELEPHONE ENCOUNTER
Phone call made spoke with Dr Manjinder Couch who will call patient to expedite the visit with him. Also call patient to let her know. Also stressed the importance of getting the MRI done ASAP.

## 2021-04-16 NOTE — TELEPHONE ENCOUNTER
Noted and appreciated.     Future Appointments   Date Time Provider Michelle Mixon   4/23/2021 10:00 AM Robert Wilson MD [facility] [place]

## 2021-04-23 ENCOUNTER — OFFICE VISIT (OUTPATIENT)
Dept: GASTROENTEROLOGY | Facility: CLINIC | Age: 76
End: 2021-04-23
Payer: MEDICARE

## 2021-04-23 ENCOUNTER — TELEPHONE (OUTPATIENT)
Dept: GASTROENTEROLOGY | Facility: CLINIC | Age: 76
End: 2021-04-23

## 2021-04-23 VITALS
HEART RATE: 66 BPM | BODY MASS INDEX: 30.48 KG/M2 | WEIGHT: 172 LBS | DIASTOLIC BLOOD PRESSURE: 90 MMHG | HEIGHT: 63 IN | SYSTOLIC BLOOD PRESSURE: 150 MMHG

## 2021-04-23 DIAGNOSIS — Z85.048 HISTORY OF RECTAL CANCER: Primary | ICD-10-CM

## 2021-04-23 DIAGNOSIS — Z12.11 SCREEN FOR COLON CANCER: Primary | ICD-10-CM

## 2021-04-23 DIAGNOSIS — K21.9 GASTROESOPHAGEAL REFLUX DISEASE WITHOUT ESOPHAGITIS: ICD-10-CM

## 2021-04-23 DIAGNOSIS — R14.0 ABDOMINAL BLOATING: ICD-10-CM

## 2021-04-23 DIAGNOSIS — Z85.038 HISTORY OF COLON CANCER: ICD-10-CM

## 2021-04-23 PROCEDURE — 3080F DIAST BP >= 90 MM HG: CPT | Performed by: INTERNAL MEDICINE

## 2021-04-23 PROCEDURE — 99214 OFFICE O/P EST MOD 30 MIN: CPT | Performed by: INTERNAL MEDICINE

## 2021-04-23 PROCEDURE — 3077F SYST BP >= 140 MM HG: CPT | Performed by: INTERNAL MEDICINE

## 2021-04-23 PROCEDURE — 3008F BODY MASS INDEX DOCD: CPT | Performed by: INTERNAL MEDICINE

## 2021-04-23 RX ORDER — OMEPRAZOLE 20 MG/1
20 CAPSULE, DELAYED RELEASE ORAL EVERY MORNING
Qty: 90 CAPSULE | Refills: 0 | Status: SHIPPED | OUTPATIENT
Start: 2021-04-23 | End: 2021-07-22

## 2021-04-23 RX ORDER — SODIUM, POTASSIUM,MAG SULFATES 17.5-3.13G
SOLUTION, RECONSTITUTED, ORAL ORAL
Qty: 1 BOTTLE | Refills: 0 | Status: SHIPPED | OUTPATIENT
Start: 2021-04-23 | End: 2021-05-14

## 2021-04-23 NOTE — PATIENT INSTRUCTIONS
1. Schedule colonoscopy with MAC [Diagnosis: screening, hx of colon cancer]    2.  bowel prep from pharmacy (split suprep)    3. Continue all medications for procedure    4. Read all bowel prep instructions carefully    5.  AVOID seeds, nuts, popcorn

## 2021-04-23 NOTE — TELEPHONE ENCOUNTER
Scheduled for:  Colonoscopy 92663  Provider Name:  Dr. Kaye Salazar  Date:  7/13/2021  Location:  [facility]  Sedation:  MAC  Time:  1:15 pm, arrival 12:15 pm  Prep:  Suprep  Meds/Allergies Reconciled?:  Physician reviewed  Diagnosis with codes:  Screening for colon can

## 2021-04-23 NOTE — PROGRESS NOTES
[facility] Follow-up Visit    Clementina neoplasm, or dilated side branch radical.  Suggest follow-up MRCP for further evaluation.          Labs 10/15/20:  CEA 1.3  CBC not anemic  LFTs nml        NSAIDS:  Tobacco:  Alcohol:  Illicit drugs:     No FH GI malignancy     No history of adverse reacti mouth daily. (Patient not taking: Reported on 4/23/2021 ) 90 tablet 0   • fluconazole (DIFLUCAN) 100 MG Oral Tab Take 1 tablet (100 mg total) by mouth daily.  (Patient not taking: Reported on 4/13/2021 ) 10 tablet 0   • furosemide (LASIX) 20 MG Oral Tab Vitaly Moss 68year old year-old female with history of cardiac cath, cad, htn, hemorrhoid, hld, rectal ca (S/p APR, now with a colostomy), here for f/u.     #pancreatic lesion - pending MRI, we discussed importance of establishing dx of possible IPMN    #Epigastric pa 17.5-3.13-1.6 GM/177ML Oral Solution 1 Bottle 0     Sig: Take as directed       Imaging & Referrals:  None     4/23/2021    KETTY Rollins MD  Pager: [phone]        This note was partially prepared using [employer] voice recognition dictation so

## 2021-04-27 ENCOUNTER — TELEPHONE (OUTPATIENT)
Dept: CASE MANAGEMENT | Age: 76
End: 2021-04-27

## 2021-04-27 NOTE — TELEPHONE ENCOUNTER
Patient is eligible for a 2021 Medicare Annual Wellness visit. Language Line  Shai Magallanes [phone] left msg to call back.

## 2021-05-07 ENCOUNTER — TELEPHONE (OUTPATIENT)
Dept: GASTROENTEROLOGY | Facility: CLINIC | Age: 76
End: 2021-05-07

## 2021-05-07 NOTE — TELEPHONE ENCOUNTER
Reached out to patient utilizing  Adeola Falk #[id number] to inform MRI authorization. Scheduled for tomorrow 05/08/2021. Left detailed message regarding below (ok per hilary).

## 2021-05-07 NOTE — TELEPHONE ENCOUNTER
Tyler requesting prior auth for 2021 MRI - needs new auth as original has . Please call to obtain prior auth and call Tyler with auth #. Thank you.

## 2021-05-07 NOTE — TELEPHONE ENCOUNTER
Patient is actually scheduled tomorrow 5/8/2021 at [address] [place] [address],  [phone] called Elizabeth and spoke to [employer] ID #[id number]. Pastor Palacios took authorization information and then connected me with Shoopi with [employer] to complete the prior authorization.   I reviewed cl no indicators present

## 2021-05-08 ENCOUNTER — HOSPITAL ENCOUNTER (OUTPATIENT)
Dept: MRI IMAGING | Facility: HOSPITAL | Age: 76
Discharge: HOME | End: 2021-05-08
Attending: NURSE PRACTITIONER
Payer: MEDICARE

## 2021-05-08 DIAGNOSIS — K86.9 PANCREATIC LESION: ICD-10-CM

## 2021-05-08 PROCEDURE — A9575 INJ GADOTERATE MEGLUMI 0.1ML: HCPCS | Performed by: NURSE PRACTITIONER

## 2021-05-08 PROCEDURE — 76376 3D RENDER W/INTRP POSTPROCES: CPT | Performed by: NURSE PRACTITIONER

## 2021-05-08 PROCEDURE — 74183 MRI ABD W/O CNTR FLWD CNTR: CPT | Performed by: NURSE PRACTITIONER

## 2021-05-08 PROCEDURE — 82565 ASSAY OF CREATININE: CPT

## 2021-05-12 ENCOUNTER — TELEPHONE (OUTPATIENT)
Dept: GASTROENTEROLOGY | Facility: CLINIC | Age: 76
End: 2021-05-12

## 2021-05-12 NOTE — TELEPHONE ENCOUNTER
Entered into Epic. Recall MRCP for 1 year per MAKENZIE Correia. Last imaging completed 05/08/2021. Recall entered into Patient Outreach for 05/08/2022. Specialty Comments updated.      Will await call back to review results and recommendations per [facility]

## 2021-05-14 ENCOUNTER — OFFICE VISIT (OUTPATIENT)
Dept: FAMILY MEDICINE CLINIC | Facility: CLINIC | Age: 76
End: 2021-05-14
Payer: MEDICARE

## 2021-05-14 VITALS
DIASTOLIC BLOOD PRESSURE: 84 MMHG | SYSTOLIC BLOOD PRESSURE: 148 MMHG | HEART RATE: 69 BPM | WEIGHT: 166.81 LBS | BODY MASS INDEX: 30 KG/M2

## 2021-05-14 DIAGNOSIS — Z85.038 HISTORY OF COLON CANCER: ICD-10-CM

## 2021-05-14 DIAGNOSIS — K86.2 PANCREATIC CYST: ICD-10-CM

## 2021-05-14 DIAGNOSIS — R30.0 DYSURIA: Primary | ICD-10-CM

## 2021-05-14 PROCEDURE — 99213 OFFICE O/P EST LOW 20 MIN: CPT | Performed by: FAMILY MEDICINE

## 2021-05-14 PROCEDURE — 81003 URINALYSIS AUTO W/O SCOPE: CPT | Performed by: FAMILY MEDICINE

## 2021-05-14 PROCEDURE — 3079F DIAST BP 80-89 MM HG: CPT | Performed by: FAMILY MEDICINE

## 2021-05-14 PROCEDURE — 3077F SYST BP >= 140 MM HG: CPT | Performed by: FAMILY MEDICINE

## 2021-05-14 RX ORDER — PHENAZOPYRIDINE HYDROCHLORIDE 200 MG/1
200 TABLET, FILM COATED ORAL 3 TIMES DAILY PRN
Qty: 10 TABLET | Refills: 2 | Status: SHIPPED | OUTPATIENT
Start: 2021-05-14 | End: 2021-05-14

## 2021-05-14 RX ORDER — PHENAZOPYRIDINE HYDROCHLORIDE 200 MG/1
200 TABLET, FILM COATED ORAL 3 TIMES DAILY PRN
Qty: 10 TABLET | Refills: 2 | Status: SHIPPED | OUTPATIENT
Start: 2021-05-14

## 2021-05-14 NOTE — PROGRESS NOTES
5/14/2021  11:13 AM    Cecilia Quigley is a 68year old female. Chief complaint(s): Patient presents with:  Test Results: f/u after she had MRI  UTI    HPI:     Cecilia Quigley primary complaint is regarding multiple complaints.      Patient is a 73-year OR Take by mouth. Taking every 3 days       • Citalopram Hydrobromide 20 MG Oral Tab Take 1 tablet (20 mg total) by mouth daily. 90 tablet 0   • losartan Potassium 100 MG Oral Tab Take 1 tablet (100 mg total) by mouth daily.  90 tablet 1   • ClonazePAM 2 MG mg/dL    Spec Gravity 1.020 1.005 - 1.030    Blood Urine Negative Negative    PH Urine 8.5 (A) 5.0 - 8.0    Protein Urine Negative Negative - Trace mg/dL    Urobilinogen Urine 0.2 0.2 - 1.0 mg/dL    Nitrite Urine Negative Negative    Leukocyte Esterase Randol Gardenia communication with the main pancreatic duct is identified. There is otherwise preservation of normal signal intensity on precontrast T1-weighted, fat-saturated images. No focal mass or main pancreatic duct dilatation is seen.  There is no evidence of panc type intraductal papillary mucinous neoplasms, dilated side branch radicles, or, in the setting of prior pancreatitis, small pseudocysts. No enhancing components are identified. A follow-up study is suggested in 1 year.   2. The pancreaticobiliary tree demo RECOMMENDATIONS given include: Patient was reassured of  her medical condition and all questions and concerns were answered.  Patient was informed to please, call our office with any new or further questions or concerns that may come up in the near future

## 2021-06-09 ENCOUNTER — TELEPHONE (OUTPATIENT)
Dept: CASE MANAGEMENT | Age: 76
End: 2021-06-09

## 2021-06-09 NOTE — TELEPHONE ENCOUNTER
Patient is eligible for a 2021 Medicare Annual Wellness visit. Discussed w/haley Mckeon. She states that she will discuss w/patient and call back.

## 2021-07-08 ENCOUNTER — TELEPHONE (OUTPATIENT)
Dept: GASTROENTEROLOGY | Facility: CLINIC | Age: 76
End: 2021-07-08

## 2021-07-08 NOTE — TELEPHONE ENCOUNTER
Per Audra PAT/RN--    RN called pt to notify her that she needs to be tested for Covid-19 prior to procedure. Pt stated, \"I'm in [place].\" Pt will not be returning in time for Covid-19 test or procedure. Pt requested that she be cancelled.  RN notified pt t

## 2021-08-27 ENCOUNTER — TELEPHONE (OUTPATIENT)
Dept: FAMILY MEDICINE CLINIC | Facility: CLINIC | Age: 76
End: 2021-08-27

## 2021-08-27 DIAGNOSIS — C20 RECTAL CANCER (HCC): ICD-10-CM

## 2021-08-27 DIAGNOSIS — Z85.038 HISTORY OF COLON CANCER: Primary | ICD-10-CM

## 2021-08-27 NOTE — TELEPHONE ENCOUNTER
Son, states that Dr. José Miguel Alcaraz, was going to order colostomy bags for the patient. Son, states that patient is out of bags. No further information was given.  Son, states that he is in [facility] and the number provided was his aunts/Celine's phone number and s

## 2021-08-27 NOTE — TELEPHONE ENCOUNTER
Left message for Shamika Ospina to call back.  If she calls back please ask her what supplier she uses along with a phone number

## 2021-08-30 NOTE — TELEPHONE ENCOUNTER
Dr. Jessie Ribeiro, DME order has been pended.      4. Colostomy care [facility])  - -Patient was getting ostomy supplies, lubricating deodorant, barrier, cohesive stoma, stoma powder, drainable pouches with filter and [facility]

## 2021-10-29 ENCOUNTER — TELEPHONE (OUTPATIENT)
Dept: FAMILY MEDICINE CLINIC | Facility: CLINIC | Age: 76
End: 2021-10-29

## 2021-11-01 ENCOUNTER — OFFICE VISIT (OUTPATIENT)
Dept: FAMILY MEDICINE CLINIC | Facility: CLINIC | Age: 76
End: 2021-11-01
Payer: MEDICARE

## 2021-11-01 VITALS
HEART RATE: 54 BPM | DIASTOLIC BLOOD PRESSURE: 82 MMHG | BODY MASS INDEX: 30.83 KG/M2 | SYSTOLIC BLOOD PRESSURE: 165 MMHG | HEIGHT: 63 IN | WEIGHT: 174 LBS

## 2021-11-01 DIAGNOSIS — N95.2 VAGINITIS, ATROPHIC: Primary | ICD-10-CM

## 2021-11-01 DIAGNOSIS — I10 PRIMARY HYPERTENSION: ICD-10-CM

## 2021-11-01 PROCEDURE — 3008F BODY MASS INDEX DOCD: CPT | Performed by: FAMILY MEDICINE

## 2021-11-01 PROCEDURE — 3077F SYST BP >= 140 MM HG: CPT | Performed by: FAMILY MEDICINE

## 2021-11-01 PROCEDURE — 90662 IIV NO PRSV INCREASED AG IM: CPT | Performed by: FAMILY MEDICINE

## 2021-11-01 PROCEDURE — 99213 OFFICE O/P EST LOW 20 MIN: CPT | Performed by: FAMILY MEDICINE

## 2021-11-01 PROCEDURE — G0008 ADMIN INFLUENZA VIRUS VAC: HCPCS | Performed by: FAMILY MEDICINE

## 2021-11-01 PROCEDURE — 3079F DIAST BP 80-89 MM HG: CPT | Performed by: FAMILY MEDICINE

## 2021-11-01 RX ORDER — ESTRADIOL 4 UG/1
1 INSERT VAGINAL
Qty: 16 EACH | Refills: 1 | Status: SHIPPED | OUTPATIENT
Start: 2021-11-01 | End: 2021-11-29

## 2021-11-01 RX ORDER — VALSARTAN 160 MG/1
160 TABLET ORAL DAILY
Qty: 30 TABLET | Refills: 3 | Status: SHIPPED | OUTPATIENT
Start: 2021-11-01

## 2021-11-01 NOTE — PROGRESS NOTES
11/1/2021  1:00 PM    Feng Wilson is a 68year old female. Chief complaint(s): Patient presents with:  Hypertension  Vaginal pains  HPI:     Feng Shoulder primary complaint is regarding as above.        Derrel Nageotte a 68year old female presen prior to today's visit):  Current Outpatient Medications   Medication Sig Dispense Refill   • Estradiol (IMVEXXY MAINTENANCE PACK) 4 MCG Vaginal Insert Place 1 suppository vaginally twice a week for 28 days.  16 each 1   • valsartan 160 MG Oral Tab Take 1 t Findings: No rash. Psychiatric:         Mood and Affect: Mood normal.         Behavior: Behavior is cooperative. LABORATORY RESULTS:     EKG / Spirometry : -     Radiology: No results found.      ASSESSMENT/PLAN:   Assessment   Primary hypertensio Signed Prescriptions Disp Refills   • Estradiol (IMVEXXY MAINTENANCE PACK) 4 MCG Vaginal Insert 16 each 1     Sig: Place 1 suppository vaginally twice a week for 28 days.    • valsartan 160 MG Oral Tab 30 tablet 3     Sig: Take 1 tablet (160 mg total) b

## 2021-11-02 NOTE — TELEPHONE ENCOUNTER
Estradiol (IMVEXXY MAINTENANCE PACK) 4 MCG Vaginal Insert, Place 1 suppository vaginally twice a week for 28 days. , Disp: 16 each, Rfl: 1    Pharmacy note: Drug not covered by patient plan.   Please call/fax the pharmacy to change medication along with stre

## 2021-11-03 ENCOUNTER — TELEPHONE (OUTPATIENT)
Dept: FAMILY MEDICINE CLINIC | Facility: CLINIC | Age: 76
End: 2021-11-03

## 2021-11-03 NOTE — TELEPHONE ENCOUNTER
Patient's granddaughter Arnol Elam is following up on a fax sent by  [employer].  The forms are needed in order for patient's insurance to cover medication

## 2021-11-04 NOTE — TELEPHONE ENCOUNTER
Spoke with  Roseline Funes from [facility],  verified. She stated pt can try estring vaginal ring 2 mg one ring every 3 months, looks like insurance will cover it for $4 per ring for vaginal  atrophy. rx pended. pls advise, thanks in advance.

## 2021-11-04 NOTE — TELEPHONE ENCOUNTER
Spoke to pharmacy medication requires a Prior Auth. Please initiate if it hasn't already been done.      Estradiol (IMVEXXY MAINTENANCE PACK) 4 MCG Vaginal Insert

## 2021-11-05 RX ORDER — ESTRADIOL 10 UG/1
1 INSERT VAGINAL
Qty: 10 TABLET | Refills: 1 | Status: SHIPPED | OUTPATIENT
Start: 2021-11-05 | End: 2021-11-12

## 2021-11-05 NOTE — TELEPHONE ENCOUNTER
Prior authorization approved Case ID: [id number]      Payer:  Zia Farrell    [phone]     [phone]    The drug you asked for is non-formulary (not on [employer] list of preferred drugs). Before the drug can be covered, we need more information.  Please ask yo

## 2021-11-10 ENCOUNTER — TELEPHONE (OUTPATIENT)
Dept: FAMILY MEDICINE CLINIC | Facility: CLINIC | Age: 76
End: 2021-11-10

## 2021-11-11 ENCOUNTER — TELEPHONE (OUTPATIENT)
Dept: FAMILY MEDICINE CLINIC | Facility: CLINIC | Age: 76
End: 2021-11-11

## 2021-11-11 NOTE — TELEPHONE ENCOUNTER
Spoke with April from [facility] in [place]--requesting clarification of Estradiol directions for 10 mcg tab. (medication covered by insurance for $1.30)    Directions say 1 mcg vaginally every 3 days    She is asking if PCP wants 10 mcg, not 1 mcg--

## 2021-11-12 RX ORDER — ESTRADIOL 10 UG/1
10 INSERT VAGINAL
Qty: 12 TABLET | Refills: 0 | Status: SHIPPED | OUTPATIENT
Start: 2021-11-12 | End: 2021-12-12

## 2021-11-12 NOTE — TELEPHONE ENCOUNTER
Yes, direction are correct. Remind her that any higher dosage will increase chance of breast cancer.

## 2021-11-12 NOTE — TELEPHONE ENCOUNTER
Spoke with Roseline  pharmacist at [facility],    Juan Diego Huddleston patient would NOT be able to insert only 1 mcg via vaginally due to tablet only comes in 10 mcg.

## 2021-11-12 NOTE — TELEPHONE ENCOUNTER
Spoke with Robby Shetty's pharmacist and relayed Dr. Saima Burgos' message below--she confirms that she spoke with Dr. Saima Burgos last week and offered alternatives to vaginal ring.     She again confirms that Estradiol Rx for tabs were received, but tab is

## 2021-11-12 NOTE — TELEPHONE ENCOUNTER
Phone call made to patient, No answer. New Rx sent to pharmacy to use estrace vag suppositories Q week.

## 2021-11-15 NOTE — TELEPHONE ENCOUNTER
Spoke with Gavin Florez, stated medication was out of stock, patient will be receiving an automated message today. Left message to call back.

## 2021-12-10 ENCOUNTER — TELEPHONE (OUTPATIENT)
Dept: CASE MANAGEMENT | Age: 76
End: 2021-12-10

## 2021-12-10 NOTE — TELEPHONE ENCOUNTER
Patient is eligible for a 2022 Medicare Annual Wellness visit. Language Line  Moshe Mcguire [phone] left ms to call back.

## 2022-01-10 ENCOUNTER — TELEPHONE (OUTPATIENT)
Dept: CASE MANAGEMENT | Age: 77
End: 2022-01-10

## 2022-01-10 NOTE — TELEPHONE ENCOUNTER
Patient is eligible for a 2022 Medicare Annual Wellness visit. This visit can be scheduled any time in the calendar year. Language Line  [phone] Linda Gary left [facility] to call back.

## 2022-03-23 RX ORDER — OMEPRAZOLE 20 MG/1
20 CAPSULE, DELAYED RELEASE ORAL EVERY MORNING
Qty: 90 CAPSULE | Refills: 1 | Status: SHIPPED | OUTPATIENT
Start: 2022-03-23

## 2022-03-23 RX ORDER — VALSARTAN 160 MG/1
160 TABLET ORAL DAILY
Qty: 90 TABLET | Refills: 1 | Status: SHIPPED | OUTPATIENT
Start: 2022-03-23 | End: 2022-03-30

## 2022-03-30 ENCOUNTER — LAB ENCOUNTER (OUTPATIENT)
Dept: LAB | Age: 77
End: 2022-03-30
Attending: FAMILY MEDICINE
Payer: MEDICARE

## 2022-03-30 ENCOUNTER — TELEPHONE (OUTPATIENT)
Dept: FAMILY MEDICINE CLINIC | Facility: CLINIC | Age: 77
End: 2022-03-30

## 2022-03-30 ENCOUNTER — EKG ENCOUNTER (OUTPATIENT)
Dept: LAB | Age: 77
End: 2022-03-30
Attending: FAMILY MEDICINE
Payer: MEDICARE

## 2022-03-30 ENCOUNTER — OFFICE VISIT (OUTPATIENT)
Dept: FAMILY MEDICINE CLINIC | Facility: CLINIC | Age: 77
End: 2022-03-30
Payer: MEDICARE

## 2022-03-30 VITALS
BODY MASS INDEX: 29.59 KG/M2 | DIASTOLIC BLOOD PRESSURE: 82 MMHG | HEIGHT: 63 IN | WEIGHT: 167 LBS | SYSTOLIC BLOOD PRESSURE: 162 MMHG | HEART RATE: 62 BPM

## 2022-03-30 DIAGNOSIS — F32.1 CURRENT MODERATE EPISODE OF MAJOR DEPRESSIVE DISORDER, UNSPECIFIED WHETHER RECURRENT (HCC): ICD-10-CM

## 2022-03-30 DIAGNOSIS — Z00.00 MEDICARE ANNUAL WELLNESS VISIT, SUBSEQUENT: Primary | ICD-10-CM

## 2022-03-30 DIAGNOSIS — N95.2 VAGINITIS, ATROPHIC: ICD-10-CM

## 2022-03-30 DIAGNOSIS — Z12.31 ENCOUNTER FOR SCREENING MAMMOGRAM FOR MALIGNANT NEOPLASM OF BREAST: ICD-10-CM

## 2022-03-30 DIAGNOSIS — E55.9 VITAMIN D DEFICIENCY: ICD-10-CM

## 2022-03-30 DIAGNOSIS — I10 ESSENTIAL HYPERTENSION: ICD-10-CM

## 2022-03-30 DIAGNOSIS — K08.89 TOOTH ACHE: ICD-10-CM

## 2022-03-30 DIAGNOSIS — C20 RECTAL CANCER (HCC): ICD-10-CM

## 2022-03-30 DIAGNOSIS — Z85.038 HISTORY OF COLON CANCER: ICD-10-CM

## 2022-03-30 DIAGNOSIS — K86.2 PANCREATIC CYST: ICD-10-CM

## 2022-03-30 DIAGNOSIS — K57.90 DIVERTICULOSIS: ICD-10-CM

## 2022-03-30 DIAGNOSIS — Z91.81 RISK FOR FALLS: ICD-10-CM

## 2022-03-30 DIAGNOSIS — M85.88 OSTEOPENIA OF SPINE: ICD-10-CM

## 2022-03-30 DIAGNOSIS — K21.9 GASTROESOPHAGEAL REFLUX DISEASE WITHOUT ESOPHAGITIS: ICD-10-CM

## 2022-03-30 DIAGNOSIS — M15.9 PRIMARY OSTEOARTHRITIS INVOLVING MULTIPLE JOINTS: ICD-10-CM

## 2022-03-30 DIAGNOSIS — N39.0 RECURRENT UTI: ICD-10-CM

## 2022-03-30 DIAGNOSIS — Z43.3 COLOSTOMY CARE (HCC): ICD-10-CM

## 2022-03-30 PROBLEM — I25.119 CORONARY ARTERY DISEASE INVOLVING NATIVE CORONARY ARTERY OF NATIVE HEART WITH ANGINA PECTORIS (HCC): Status: RESOLVED | Noted: 2018-06-13 | Resolved: 2022-03-30

## 2022-03-30 PROBLEM — I50.9 CHRONIC CONGESTIVE HEART FAILURE (HCC): Status: RESOLVED | Noted: 2020-11-13 | Resolved: 2022-03-30

## 2022-03-30 PROBLEM — I25.119 CORONARY ARTERY DISEASE INVOLVING NATIVE CORONARY ARTERY OF NATIVE HEART WITH ANGINA PECTORIS: Status: RESOLVED | Noted: 2018-06-13 | Resolved: 2022-03-30

## 2022-03-30 LAB
ALBUMIN SERPL-MCNC: 4 G/DL (ref 3.4–5)
ALBUMIN/GLOB SERPL: 1.1 {RATIO} (ref 1–2)
ALP LIVER SERPL-CCNC: 50 U/L
ALT SERPL-CCNC: 20 U/L
ANION GAP SERPL CALC-SCNC: 4 MMOL/L (ref 0–18)
AST SERPL-CCNC: 19 U/L (ref 15–37)
BASOPHILS # BLD AUTO: 0.03 X10(3) UL (ref 0–0.2)
BASOPHILS NFR BLD AUTO: 0.7 %
BILIRUB SERPL-MCNC: 0.8 MG/DL (ref 0.1–2)
BILIRUB UR QL: NEGATIVE
BUN BLD-MCNC: 19 MG/DL (ref 7–18)
BUN/CREAT SERPL: 23.2 (ref 10–20)
CALCIUM BLD-MCNC: 9.6 MG/DL (ref 8.5–10.1)
CEA SERPL-MCNC: 1.3 NG/ML (ref ?–5)
CHLORIDE SERPL-SCNC: 107 MMOL/L (ref 98–112)
CHOLEST SERPL-MCNC: 226 MG/DL (ref ?–200)
CO2 SERPL-SCNC: 32 MMOL/L (ref 21–32)
COLOR UR: YELLOW
CREAT BLD-MCNC: 0.82 MG/DL
DEPRECATED RDW RBC AUTO: 44 FL (ref 35.1–46.3)
EOSINOPHIL # BLD AUTO: 0.1 X10(3) UL (ref 0–0.7)
EOSINOPHIL NFR BLD AUTO: 2.4 %
ERYTHROCYTE [DISTWIDTH] IN BLOOD BY AUTOMATED COUNT: 13 % (ref 11–15)
EST. AVERAGE GLUCOSE BLD GHB EST-MCNC: 134 MG/DL (ref 68–126)
FASTING PATIENT LIPID ANSWER: YES
FASTING STATUS PATIENT QL REPORTED: YES
GLOBULIN PLAS-MCNC: 3.5 G/DL (ref 2.8–4.4)
GLUCOSE BLD-MCNC: 98 MG/DL (ref 70–99)
GLUCOSE UR-MCNC: NEGATIVE MG/DL
HBA1C MFR BLD: 6.3 % (ref ?–5.7)
HCT VFR BLD AUTO: 44.2 %
HDLC SERPL-MCNC: 59 MG/DL (ref 40–59)
HGB BLD-MCNC: 14 G/DL
HGB UR QL STRIP.AUTO: NEGATIVE
IMM GRANULOCYTES # BLD AUTO: 0.01 X10(3) UL (ref 0–1)
IMM GRANULOCYTES NFR BLD: 0.2 %
LDLC SERPL CALC-MCNC: 154 MG/DL (ref ?–100)
LYMPHOCYTES # BLD AUTO: 1.25 X10(3) UL (ref 1–4)
LYMPHOCYTES NFR BLD AUTO: 30 %
MCH RBC QN AUTO: 29 PG (ref 26–34)
MCHC RBC AUTO-ENTMCNC: 31.7 G/DL (ref 31–37)
MCV RBC AUTO: 91.7 FL
MONOCYTES # BLD AUTO: 0.49 X10(3) UL (ref 0.1–1)
MONOCYTES NFR BLD AUTO: 11.8 %
NEUTROPHILS # BLD AUTO: 2.29 X10 (3) UL (ref 1.5–7.7)
NEUTROPHILS # BLD AUTO: 2.29 X10(3) UL (ref 1.5–7.7)
NEUTROPHILS NFR BLD AUTO: 54.9 %
NITRITE UR QL STRIP.AUTO: NEGATIVE
NONHDLC SERPL-MCNC: 167 MG/DL (ref ?–130)
OSMOLALITY SERPL CALC.SUM OF ELEC: 298 MOSM/KG (ref 275–295)
PH UR: 6 [PH] (ref 5–8)
PLATELET # BLD AUTO: 164 10(3)UL (ref 150–450)
POTASSIUM SERPL-SCNC: 4.7 MMOL/L (ref 3.5–5.1)
PROT SERPL-MCNC: 7.5 G/DL (ref 6.4–8.2)
PROT UR-MCNC: NEGATIVE MG/DL
RBC # BLD AUTO: 4.82 X10(6)UL
SODIUM SERPL-SCNC: 143 MMOL/L (ref 136–145)
SP GR UR STRIP: 1.02 (ref 1–1.03)
TRIGL SERPL-MCNC: 76 MG/DL (ref 30–149)
TSI SER-ACNC: 0.67 MIU/ML (ref 0.36–3.74)
UROBILINOGEN UR STRIP-ACNC: 4
VIT C UR-MCNC: NEGATIVE MG/DL
VIT D+METAB SERPL-MCNC: 20.4 NG/ML (ref 30–100)
VLDLC SERPL CALC-MCNC: 14 MG/DL (ref 0–30)
WBC # BLD AUTO: 4.2 X10(3) UL (ref 4–11)

## 2022-03-30 PROCEDURE — 85025 COMPLETE CBC W/AUTO DIFF WBC: CPT

## 2022-03-30 PROCEDURE — 87086 URINE CULTURE/COLONY COUNT: CPT

## 2022-03-30 PROCEDURE — 83036 HEMOGLOBIN GLYCOSYLATED A1C: CPT

## 2022-03-30 PROCEDURE — 93010 ELECTROCARDIOGRAM REPORT: CPT | Performed by: FAMILY MEDICINE

## 2022-03-30 PROCEDURE — G0439 PPPS, SUBSEQ VISIT: HCPCS | Performed by: FAMILY MEDICINE

## 2022-03-30 PROCEDURE — 3077F SYST BP >= 140 MM HG: CPT | Performed by: FAMILY MEDICINE

## 2022-03-30 PROCEDURE — 82378 CARCINOEMBRYONIC ANTIGEN: CPT

## 2022-03-30 PROCEDURE — 84443 ASSAY THYROID STIM HORMONE: CPT

## 2022-03-30 PROCEDURE — 80053 COMPREHEN METABOLIC PANEL: CPT

## 2022-03-30 PROCEDURE — 80061 LIPID PANEL: CPT

## 2022-03-30 PROCEDURE — 36415 COLL VENOUS BLD VENIPUNCTURE: CPT

## 2022-03-30 PROCEDURE — 96160 PT-FOCUSED HLTH RISK ASSMT: CPT | Performed by: FAMILY MEDICINE

## 2022-03-30 PROCEDURE — 82306 VITAMIN D 25 HYDROXY: CPT

## 2022-03-30 PROCEDURE — 93005 ELECTROCARDIOGRAM TRACING: CPT

## 2022-03-30 PROCEDURE — 81001 URINALYSIS AUTO W/SCOPE: CPT

## 2022-03-30 PROCEDURE — 99397 PER PM REEVAL EST PAT 65+ YR: CPT | Performed by: FAMILY MEDICINE

## 2022-03-30 PROCEDURE — 3008F BODY MASS INDEX DOCD: CPT | Performed by: FAMILY MEDICINE

## 2022-03-30 PROCEDURE — 3079F DIAST BP 80-89 MM HG: CPT | Performed by: FAMILY MEDICINE

## 2022-03-30 RX ORDER — OLOPATADINE HYDROCHLORIDE 1 MG/ML
1 SOLUTION/ DROPS OPHTHALMIC 2 TIMES DAILY
Qty: 5 ML | Refills: 2 | Status: SHIPPED | OUTPATIENT
Start: 2022-03-30

## 2022-03-30 RX ORDER — ASPIRIN 81 MG/1
81 TABLET ORAL DAILY
Qty: 90 TABLET | Refills: 1 | Status: SHIPPED | OUTPATIENT
Start: 2022-03-30

## 2022-03-30 RX ORDER — AMOXICILLIN 500 MG/1
500 CAPSULE ORAL 3 TIMES DAILY
Qty: 30 CAPSULE | Refills: 0 | Status: SHIPPED | OUTPATIENT
Start: 2022-03-30 | End: 2022-04-09

## 2022-03-30 RX ORDER — VALSARTAN 160 MG/1
160 TABLET ORAL DAILY
Qty: 90 TABLET | Refills: 1 | Status: SHIPPED | OUTPATIENT
Start: 2022-03-30

## 2022-03-30 NOTE — TELEPHONE ENCOUNTER
olopatadine (PATADAY) 0.1 % Ophthalmic Solution, Place 1 drop into both eyes 2 (two) times daily. , Disp: 5 mL, Rfl: 2    Pharmacy note: Drug not covered by patient plan. Please call/fax the pharmacy to change medication along with strength, directions, quantity,and refills.

## 2022-03-31 RX ORDER — ERGOCALCIFEROL 1.25 MG/1
50000 CAPSULE ORAL WEEKLY
Qty: 12 CAPSULE | Refills: 4 | Status: SHIPPED | OUTPATIENT
Start: 2022-03-31 | End: 2022-04-30

## 2022-03-31 RX ORDER — ATORVASTATIN CALCIUM 20 MG/1
20 TABLET, FILM COATED ORAL NIGHTLY
Qty: 90 TABLET | Refills: 3 | Status: SHIPPED | OUTPATIENT
Start: 2022-03-31

## 2022-03-31 NOTE — TELEPHONE ENCOUNTER
Pharmacist is needing clarification on the aspirin dose sent in as the following:    aspirin (ASPIRIN 81) 81 MG Oral Tab EC 90 tablet 1 3/30/2022     Sig - Route: Take 1 tablet (81 mg total) by mouth daily.  Taking every 3 days - Oral      Please advise

## 2022-03-31 NOTE — TELEPHONE ENCOUNTER
Left message to call back, need to clarify aspirin dose. Also Pataday eye drop is over the counter, confirm if patient will purchase.

## 2022-04-01 NOTE — TELEPHONE ENCOUNTER
Spoke with [facility] pharmacist, and relayed Dr. Tommy Deluna' message below--he verbalizes understanding and agreement and will notify patient. No further questions/concerns at this time.

## 2022-04-07 ENCOUNTER — HOSPITAL ENCOUNTER (OUTPATIENT)
Dept: MAMMOGRAPHY | Facility: HOSPITAL | Age: 77
Discharge: HOME | End: 2022-04-07
Attending: FAMILY MEDICINE
Payer: MEDICARE

## 2022-04-07 DIAGNOSIS — Z12.31 ENCOUNTER FOR SCREENING MAMMOGRAM FOR MALIGNANT NEOPLASM OF BREAST: ICD-10-CM

## 2022-04-07 PROCEDURE — 77067 SCR MAMMO BI INCL CAD: CPT | Performed by: FAMILY MEDICINE

## 2022-04-07 PROCEDURE — 77063 BREAST TOMOSYNTHESIS BI: CPT | Performed by: FAMILY MEDICINE

## 2022-04-12 ENCOUNTER — TELEPHONE (OUTPATIENT)
Dept: GASTROENTEROLOGY | Facility: CLINIC | Age: 77
End: 2022-04-12

## 2022-04-12 NOTE — TELEPHONE ENCOUNTER
Sendy--    Received x 1 year mri/mrcp recall reminder. Last test performed 05/08/2021. Recommended pancreatic cystic lesion follow up. Please provide orders if appropriate to plan for repeat imaging. Thank you!

## 2022-04-12 NOTE — TELEPHONE ENCOUNTER
Patient outreach message received:    Entered into Epic. Recall MRCP for 1 year per MAKENZIE Juarez. Last imaging completed 05/08/2021.  Recall entered into Patient Outreach for 05/08/2022

## 2022-04-13 NOTE — TELEPHONE ENCOUNTER
Utilized language line solutions; Matteo Anaya #[id number]. Recommended repeat mri/mrcp 2/2 pancreatic lesion surveillance. Left message to call back. 1st attempt. Plan to await call back and/or follow up.

## 2022-04-20 NOTE — TELEPHONE ENCOUNTER
[employer] language line [employer]; Bertin Banks  #[id number]    Contacted patients home & mobile number. Left message to call back. 2nd attempt. Plan to await call back and/or follow up. Self note:     Reminder due for recommended repeat mri/mrcp 2/2 pancreatic lesion / surveillance.

## 2022-04-26 NOTE — TELEPHONE ENCOUNTER
Utilized language line solutions; Priscila Albrecht #[id number]     Due for recommended repeat mri/mrcp 2/2 pancreatic lesion / surveillance. Left message to call back. 3rd attempt. Multiple attempts with no return call. TE closed.

## 2022-05-27 ENCOUNTER — TELEPHONE (OUTPATIENT)
Dept: GASTROENTEROLOGY | Facility: CLINIC | Age: 77
End: 2022-05-27

## 2022-09-29 ENCOUNTER — OFFICE VISIT (OUTPATIENT)
Dept: FAMILY MEDICINE CLINIC | Facility: CLINIC | Age: 77
End: 2022-09-29

## 2022-09-29 VITALS
HEART RATE: 76 BPM | BODY MASS INDEX: 29.35 KG/M2 | WEIGHT: 165.63 LBS | SYSTOLIC BLOOD PRESSURE: 153 MMHG | HEIGHT: 63 IN | DIASTOLIC BLOOD PRESSURE: 83 MMHG

## 2022-09-29 DIAGNOSIS — N30.00 ACUTE CYSTITIS WITHOUT HEMATURIA: ICD-10-CM

## 2022-09-29 DIAGNOSIS — I10 ESSENTIAL HYPERTENSION: Primary | ICD-10-CM

## 2022-09-29 LAB
BILIRUBIN: NEGATIVE
GLUCOSE (URINE DIPSTICK): NEGATIVE MG/DL
KETONES (URINE DIPSTICK): NEGATIVE MG/DL
MULTISTIX EXPIRATION DATE: ABNORMAL DATE
MULTISTIX LOT#: ABNORMAL NUMERIC
NITRITE, URINE: NEGATIVE
PH, URINE: 7 (ref 4.5–8)
PROTEIN (URINE DIPSTICK): NEGATIVE MG/DL
SPECIFIC GRAVITY: 1.01 (ref 1–1.03)
URINE-COLOR: YELLOW
UROBILINOGEN,SEMI-QN: 1 MG/DL (ref 0–1.9)

## 2022-09-29 PROCEDURE — G0009 ADMIN PNEUMOCOCCAL VACCINE: HCPCS | Performed by: FAMILY MEDICINE

## 2022-09-29 PROCEDURE — 90677 PCV20 VACCINE IM: CPT | Performed by: FAMILY MEDICINE

## 2022-09-29 PROCEDURE — 1126F AMNT PAIN NOTED NONE PRSNT: CPT | Performed by: FAMILY MEDICINE

## 2022-09-29 PROCEDURE — 81003 URINALYSIS AUTO W/O SCOPE: CPT | Performed by: FAMILY MEDICINE

## 2022-09-29 PROCEDURE — 99212 OFFICE O/P EST SF 10 MIN: CPT | Performed by: FAMILY MEDICINE

## 2022-09-29 PROCEDURE — 3079F DIAST BP 80-89 MM HG: CPT | Performed by: FAMILY MEDICINE

## 2022-09-29 PROCEDURE — 90662 IIV NO PRSV INCREASED AG IM: CPT | Performed by: FAMILY MEDICINE

## 2022-09-29 PROCEDURE — 3008F BODY MASS INDEX DOCD: CPT | Performed by: FAMILY MEDICINE

## 2022-09-29 PROCEDURE — 3077F SYST BP >= 140 MM HG: CPT | Performed by: FAMILY MEDICINE

## 2022-09-29 PROCEDURE — G0008 ADMIN INFLUENZA VIRUS VAC: HCPCS | Performed by: FAMILY MEDICINE

## 2022-09-29 RX ORDER — AMLODIPINE BESYLATE 5 MG/1
5 TABLET ORAL DAILY
Qty: 30 TABLET | Refills: 3 | Status: SHIPPED | OUTPATIENT
Start: 2022-09-29

## 2022-09-29 RX ORDER — CHLORTHALIDONE 25 MG/1
25 TABLET ORAL DAILY
Qty: 30 TABLET | Refills: 3 | Status: SHIPPED | OUTPATIENT
Start: 2022-09-29

## 2022-09-29 RX ORDER — CIPROFLOXACIN 500 MG/1
500 TABLET, FILM COATED ORAL 2 TIMES DAILY
Qty: 20 TABLET | Refills: 0 | Status: SHIPPED | OUTPATIENT
Start: 2022-09-29 | End: 2022-10-09

## 2022-10-19 ENCOUNTER — OFFICE VISIT (OUTPATIENT)
Dept: FAMILY MEDICINE CLINIC | Facility: CLINIC | Age: 77
End: 2022-10-19
Payer: MEDICARE

## 2022-10-19 VITALS
HEIGHT: 63 IN | HEART RATE: 75 BPM | BODY MASS INDEX: 29.35 KG/M2 | WEIGHT: 165.63 LBS | SYSTOLIC BLOOD PRESSURE: 169 MMHG | DIASTOLIC BLOOD PRESSURE: 82 MMHG

## 2022-10-19 DIAGNOSIS — I10 ESSENTIAL HYPERTENSION: Primary | ICD-10-CM

## 2022-10-19 DIAGNOSIS — K59.01 SLOW TRANSIT CONSTIPATION: ICD-10-CM

## 2022-10-19 PROCEDURE — 3079F DIAST BP 80-89 MM HG: CPT | Performed by: FAMILY MEDICINE

## 2022-10-19 PROCEDURE — 1126F AMNT PAIN NOTED NONE PRSNT: CPT | Performed by: FAMILY MEDICINE

## 2022-10-19 PROCEDURE — 3008F BODY MASS INDEX DOCD: CPT | Performed by: FAMILY MEDICINE

## 2022-10-19 PROCEDURE — 3077F SYST BP >= 140 MM HG: CPT | Performed by: FAMILY MEDICINE

## 2022-10-19 PROCEDURE — 99213 OFFICE O/P EST LOW 20 MIN: CPT | Performed by: FAMILY MEDICINE

## 2022-10-19 RX ORDER — VALSARTAN 160 MG/1
160 TABLET ORAL DAILY
Qty: 90 TABLET | Refills: 1 | Status: SHIPPED | OUTPATIENT
Start: 2022-10-19

## 2022-10-19 RX ORDER — DOCUSATE SODIUM 100 MG/1
100 CAPSULE, LIQUID FILLED ORAL 2 TIMES DAILY
Qty: 100 CAPSULE | Refills: 3 | Status: SHIPPED | OUTPATIENT
Start: 2022-10-19

## 2022-11-16 ENCOUNTER — TELEPHONE (OUTPATIENT)
Dept: FAMILY MEDICINE CLINIC | Facility: CLINIC | Age: 77
End: 2022-11-16

## 2022-11-16 NOTE — TELEPHONE ENCOUNTER
[facility]  [facility]# [id number] for medication adherence consult. LVM for patient to call me back.

## 2022-11-17 NOTE — TELEPHONE ENCOUNTER
Attempted to reach patient via [employer]   ID# [id number]. Left message with family member to have patient call me back.

## 2022-11-18 NOTE — TELEPHONE ENCOUNTER
Attempted to reach patient via [address] [place] [address]  ID# [id number]. Unable to reach patient x2. Will close encounter.

## 2022-12-12 ENCOUNTER — TELEPHONE (OUTPATIENT)
Dept: FAMILY MEDICINE CLINIC | Facility: CLINIC | Age: 77
End: 2022-12-12

## 2023-01-24 DIAGNOSIS — Z43.3 COLOSTOMY CARE (HCC): ICD-10-CM

## 2023-01-24 DIAGNOSIS — C20 RECTAL CANCER (HCC): Primary | ICD-10-CM

## 2023-01-24 DIAGNOSIS — C21.8 MALIGNANT NEOPLASM OF ANORECTUM (HCC): ICD-10-CM

## 2023-01-26 ENCOUNTER — TELEPHONE (OUTPATIENT)
Dept: FAMILY MEDICINE CLINIC | Facility: CLINIC | Age: 78
End: 2023-01-26

## 2023-03-02 NOTE — TELEPHONE ENCOUNTER
Ecotrin 81 mg po Q day, Pataday OTC is good. Why Was The Change Made?: Please Select the Appropriate Response

## 2023-04-11 ENCOUNTER — TELEPHONE (OUTPATIENT)
Dept: FAMILY MEDICINE CLINIC | Facility: CLINIC | Age: 78
End: 2023-04-11

## 2023-05-17 ENCOUNTER — TELEPHONE (OUTPATIENT)
Dept: FAMILY MEDICINE CLINIC | Facility: CLINIC | Age: 78
End: 2023-05-17

## 2023-05-31 ENCOUNTER — TELEPHONE (OUTPATIENT)
Dept: FAMILY MEDICINE CLINIC | Facility: CLINIC | Age: 78
End: 2023-05-31

## 2023-05-31 DIAGNOSIS — C21.8 MALIGNANT NEOPLASM OF ANORECTUM (HCC): Primary | ICD-10-CM

## 2023-06-14 ENCOUNTER — TELEPHONE (OUTPATIENT)
Dept: FAMILY MEDICINE CLINIC | Facility: CLINIC | Age: 78
End: 2023-06-14

## 2023-07-13 ENCOUNTER — TELEPHONE (OUTPATIENT)
Dept: FAMILY MEDICINE CLINIC | Facility: CLINIC | Age: 78
End: 2023-07-13

## 2023-09-23 ENCOUNTER — TELEPHONE (OUTPATIENT)
Dept: FAMILY MEDICINE CLINIC | Facility: CLINIC | Age: 78
End: 2023-09-23

## 2023-09-23 DIAGNOSIS — Z43.3 COLOSTOMY CARE (HCC): ICD-10-CM

## 2023-09-23 DIAGNOSIS — C21.8 MALIGNANT NEOPLASM OF ANORECTUM (HCC): Primary | ICD-10-CM

## 2023-09-27 ENCOUNTER — TELEPHONE (OUTPATIENT)
Dept: FAMILY MEDICINE CLINIC | Facility: CLINIC | Age: 78
End: 2023-09-27

## 2023-10-20 ENCOUNTER — NURSE TRIAGE (OUTPATIENT)
Dept: FAMILY MEDICINE CLINIC | Facility: CLINIC | Age: 78
End: 2023-10-20

## 2023-10-20 NOTE — TELEPHONE ENCOUNTER
Future Appointments   Date Time Provider Michelle Apurva   10/21/2023 11:00 AM Jo Ann Jimenez MD [facility] ADO     I called patient in Guamanian --> I left detailed message on LIT verified # [phone], also making aware of [employer] message being sent recommendations. CSS to assist    ENGLISH TRANSLATION OF [employer] MESSAGE SENT:  Good Afternoon Lucia Candelario,    I left you a detailed voicemail message making you aware your office visit was scheduled for tomorrow 10/21/23 at 11 am. Please anticipate a waiting time. Please arrive as scheduled. If you have any questions, please call our office at [phone], option #2 and ask to speak with a telephone triage nurse. You may also ask for a .       Our phone hours are:  Monday - Friday 8 AM - 4:30 PM  Saturday  8 AM - 12 PM  Sunday   Closed    Thank Angele Cooks, RN-BSN

## 2023-10-20 NOTE — TELEPHONE ENCOUNTER
CSS called RN to make sure it was ok to add patient tomorrow as there are already 2 people scheduled at 11:00 AM. Relayed patient can be added per Dr. Amos Davies' instruction below but to make sure patient was aware there would be a wait time.

## 2023-10-20 NOTE — TELEPHONE ENCOUNTER
Action Requested: Summary for Provider     []  Critical Lab, Recommendations Needed  [x] Need Additional Advice  []   FYI    []   Need Orders  [] Need Medications Sent to Pharmacy  []  Other     SUMMARY: With  [facility] ID # [id number]    Patient asking if she can be seen today or tomorrow by Brooke Aguilar. No available appointment please advise. Spoke to patient and her son Kamari Castano. Patient was away for 2 months in [facility]. Patient tripped and fell 3 weeks ago and landed on her knees. Patient states she has bruising and swelling to both knees. She is able to walk with difficulty. Son states patient also has a colostomy. No issues with colostomy     Patient  also states she has been having burning with urination and flank pain that comes and goes for \"quite a while\"    No fever, blood in urine or severe flank pain.            Reason for call: Urinary Symptoms and Leg or Foot Injury  Onset: 3 weeks  Reason for Disposition   Side (flank) or lower back pain present   Patient wants to be seen    Protocols used: Urination Pain - Female-A-[place], Knee Injury-A-[place]

## 2023-10-21 ENCOUNTER — HOSPITAL ENCOUNTER (OUTPATIENT)
Dept: GENERAL RADIOLOGY | Age: 78
Discharge: HOME | End: 2023-10-21
Attending: FAMILY MEDICINE

## 2023-10-21 ENCOUNTER — OFFICE VISIT (OUTPATIENT)
Dept: FAMILY MEDICINE CLINIC | Facility: CLINIC | Age: 78
End: 2023-10-21

## 2023-10-21 VITALS
BODY MASS INDEX: 30.48 KG/M2 | DIASTOLIC BLOOD PRESSURE: 80 MMHG | SYSTOLIC BLOOD PRESSURE: 120 MMHG | HEIGHT: 63 IN | WEIGHT: 172 LBS

## 2023-10-21 DIAGNOSIS — I10 ESSENTIAL HYPERTENSION: ICD-10-CM

## 2023-10-21 DIAGNOSIS — R30.0 DYSURIA: Primary | ICD-10-CM

## 2023-10-21 DIAGNOSIS — S89.91XD INJURY OF RIGHT KNEE, SUBSEQUENT ENCOUNTER: ICD-10-CM

## 2023-10-21 LAB
APPEARANCE: CLEAR
BILIRUBIN: NEGATIVE
GLUCOSE (URINE DIPSTICK): NEGATIVE MG/DL
KETONES (URINE DIPSTICK): NEGATIVE MG/DL
LEUKOCYTES: NEGATIVE
MULTISTIX LOT#: NORMAL NUMERIC
NITRITE, URINE: NEGATIVE
OCCULT BLOOD: NEGATIVE
PH, URINE: 7 (ref 4.5–8)
PROTEIN (URINE DIPSTICK): NEGATIVE MG/DL
SPECIFIC GRAVITY: 1.02 (ref 1–1.03)
URINE-COLOR: YELLOW
UROBILINOGEN,SEMI-QN: 1 MG/DL (ref 0–1.9)

## 2023-10-21 PROCEDURE — 99213 OFFICE O/P EST LOW 20 MIN: CPT | Performed by: FAMILY MEDICINE

## 2023-10-21 PROCEDURE — 73562 X-RAY EXAM OF KNEE 3: CPT | Performed by: FAMILY MEDICINE

## 2023-10-21 PROCEDURE — 1160F RVW MEDS BY RX/DR IN RCRD: CPT | Performed by: FAMILY MEDICINE

## 2023-10-21 PROCEDURE — 1159F MED LIST DOCD IN RCRD: CPT | Performed by: FAMILY MEDICINE

## 2023-10-21 PROCEDURE — 3074F SYST BP LT 130 MM HG: CPT | Performed by: FAMILY MEDICINE

## 2023-10-21 PROCEDURE — 3008F BODY MASS INDEX DOCD: CPT | Performed by: FAMILY MEDICINE

## 2023-10-21 PROCEDURE — 81002 URINALYSIS NONAUTO W/O SCOPE: CPT | Performed by: FAMILY MEDICINE

## 2023-10-21 PROCEDURE — 1126F AMNT PAIN NOTED NONE PRSNT: CPT | Performed by: FAMILY MEDICINE

## 2023-10-21 PROCEDURE — 3079F DIAST BP 80-89 MM HG: CPT | Performed by: FAMILY MEDICINE

## 2023-10-21 RX ORDER — AMLODIPINE BESYLATE 5 MG/1
5 TABLET ORAL DAILY
Qty: 90 TABLET | Refills: 1 | Status: SHIPPED | OUTPATIENT
Start: 2023-10-21

## 2023-10-21 RX ORDER — ATORVASTATIN CALCIUM 20 MG/1
20 TABLET, FILM COATED ORAL NIGHTLY
Qty: 90 TABLET | Refills: 1 | Status: SHIPPED | OUTPATIENT
Start: 2023-10-21

## 2023-10-21 RX ORDER — VALSARTAN 160 MG/1
160 TABLET ORAL DAILY
Qty: 90 TABLET | Refills: 1 | Status: SHIPPED | OUTPATIENT
Start: 2023-10-21

## 2023-11-30 ENCOUNTER — TELEPHONE (OUTPATIENT)
Dept: FAMILY MEDICINE CLINIC | Facility: CLINIC | Age: 78
End: 2023-11-30

## 2023-11-30 NOTE — TELEPHONE ENCOUNTER
Called to schedule annual physical. Patient was not available. Will call back to schedule her appointment.

## 2024-02-20 ENCOUNTER — TELEPHONE (OUTPATIENT)
Dept: FAMILY MEDICINE CLINIC | Facility: CLINIC | Age: 79
End: 2024-02-20

## 2024-02-20 NOTE — TELEPHONE ENCOUNTER
[facility] will be faxing a request for additional information that is needed before patient's DME order can be completed. Fax number verified.

## 2024-02-22 ENCOUNTER — MED REC SCAN ONLY (OUTPATIENT)
Dept: FAMILY MEDICINE CLINIC | Facility: CLINIC | Age: 79
End: 2024-02-22

## 2024-04-24 ENCOUNTER — NURSE TRIAGE (OUTPATIENT)
Dept: FAMILY MEDICINE CLINIC | Facility: CLINIC | Age: 79
End: 2024-04-24

## 2024-04-24 NOTE — TELEPHONE ENCOUNTER
Action Requested: Summary for Provider     []  Critical Lab, Recommendations Needed  [] Need Additional Advice  []   FYI    []   Need Orders  [] Need Medications Sent to Pharmacy  []  Other     SUMMARY: appt made , pt/niece called, s/s lower abdominal pain, right x 1 month on/off, denies n/v , decline to see other Drs, advised UC/ER if s/s worsen before appt, verbalized understanding     Reason for call: Abdominal Pain  Onset: 1 month                   Reason for Disposition   MILD pain (e.g., does not interfere with normal activities) and pain comes and goes (cramps) lasts > 48 hours  (Exception: This same abdominal pain is a chronic symptom recurrent or ongoing AND present > 4 weeks.)    Protocols used: Abdominal Pain - Female-A-[place]

## 2024-05-07 ENCOUNTER — LAB ENCOUNTER (OUTPATIENT)
Dept: LAB | Age: 79
End: 2024-05-07
Attending: FAMILY MEDICINE
Payer: MEDICARE

## 2024-05-07 ENCOUNTER — OFFICE VISIT (OUTPATIENT)
Dept: FAMILY MEDICINE CLINIC | Facility: CLINIC | Age: 79
End: 2024-05-07

## 2024-05-07 VITALS
SYSTOLIC BLOOD PRESSURE: 138 MMHG | HEART RATE: 79 BPM | BODY MASS INDEX: 30.5 KG/M2 | HEIGHT: 63 IN | WEIGHT: 172.13 LBS | DIASTOLIC BLOOD PRESSURE: 82 MMHG

## 2024-05-07 DIAGNOSIS — F32.4 MAJOR DEPRESSIVE DISORDER IN PARTIAL REMISSION, UNSPECIFIED WHETHER RECURRENT (HCC): ICD-10-CM

## 2024-05-07 DIAGNOSIS — E78.00 PURE HYPERCHOLESTEROLEMIA: ICD-10-CM

## 2024-05-07 DIAGNOSIS — Z85.048 HISTORY OF RECTAL CANCER: ICD-10-CM

## 2024-05-07 DIAGNOSIS — M15.9 PRIMARY OSTEOARTHRITIS INVOLVING MULTIPLE JOINTS: ICD-10-CM

## 2024-05-07 DIAGNOSIS — I10 ESSENTIAL HYPERTENSION: ICD-10-CM

## 2024-05-07 DIAGNOSIS — R73.9 HYPERGLYCEMIA: ICD-10-CM

## 2024-05-07 DIAGNOSIS — K21.9 GASTROESOPHAGEAL REFLUX DISEASE WITHOUT ESOPHAGITIS: ICD-10-CM

## 2024-05-07 DIAGNOSIS — Z91.81 RISK FOR FALLS: ICD-10-CM

## 2024-05-07 DIAGNOSIS — M85.88 OSTEOPENIA OF LUMBAR SPINE: ICD-10-CM

## 2024-05-07 DIAGNOSIS — E55.9 VITAMIN D DEFICIENCY: ICD-10-CM

## 2024-05-07 DIAGNOSIS — Z43.3 COLOSTOMY CARE (HCC): ICD-10-CM

## 2024-05-07 DIAGNOSIS — Z00.00 MEDICARE ANNUAL WELLNESS VISIT, SUBSEQUENT: Primary | ICD-10-CM

## 2024-05-07 DIAGNOSIS — K59.01 SLOW TRANSIT CONSTIPATION: ICD-10-CM

## 2024-05-07 LAB
ALBUMIN SERPL-MCNC: 4.3 G/DL (ref 3.2–4.8)
ALBUMIN/GLOB SERPL: 1.3 {RATIO} (ref 1–2)
ALP LIVER SERPL-CCNC: 51 U/L
ALT SERPL-CCNC: 12 U/L
ANION GAP SERPL CALC-SCNC: 5 MMOL/L (ref 0–18)
AST SERPL-CCNC: 20 U/L (ref ?–34)
BASOPHILS # BLD AUTO: 0.03 X10(3) UL (ref 0–0.2)
BASOPHILS NFR BLD AUTO: 0.7 %
BILIRUB SERPL-MCNC: 0.6 MG/DL (ref 0.2–1.1)
BILIRUB UR QL: NEGATIVE
BUN BLD-MCNC: 12 MG/DL (ref 9–23)
BUN/CREAT SERPL: 13.8 (ref 10–20)
CALCIUM BLD-MCNC: 9.3 MG/DL (ref 8.7–10.4)
CEA SERPL-MCNC: 1.2 NG/ML (ref ?–5)
CHLORIDE SERPL-SCNC: 107 MMOL/L (ref 98–112)
CHOLEST SERPL-MCNC: 142 MG/DL (ref ?–200)
CLARITY UR: CLEAR
CO2 SERPL-SCNC: 30 MMOL/L (ref 21–32)
COLOR UR: COLORLESS
CREAT BLD-MCNC: 0.87 MG/DL
DEPRECATED RDW RBC AUTO: 44.5 FL (ref 35.1–46.3)
EGFRCR SERPLBLD CKD-EPI 2021: 68 ML/MIN/1.73M2 (ref 60–?)
EOSINOPHIL # BLD AUTO: 0.11 X10(3) UL (ref 0–0.7)
EOSINOPHIL NFR BLD AUTO: 2.4 %
ERYTHROCYTE [DISTWIDTH] IN BLOOD BY AUTOMATED COUNT: 13.7 % (ref 11–15)
EST. AVERAGE GLUCOSE BLD GHB EST-MCNC: 140 MG/DL (ref 68–126)
FASTING PATIENT LIPID ANSWER: YES
FASTING STATUS PATIENT QL REPORTED: YES
GLOBULIN PLAS-MCNC: 3.2 G/DL (ref 2–3.5)
GLUCOSE BLD-MCNC: 92 MG/DL (ref 70–99)
GLUCOSE UR-MCNC: NORMAL MG/DL
HBA1C MFR BLD: 6.5 % (ref ?–5.7)
HCT VFR BLD AUTO: 39.8 %
HDLC SERPL-MCNC: 56 MG/DL (ref 40–59)
HGB BLD-MCNC: 13.3 G/DL
HGB UR QL STRIP.AUTO: NEGATIVE
IMM GRANULOCYTES # BLD AUTO: 0.01 X10(3) UL (ref 0–1)
IMM GRANULOCYTES NFR BLD: 0.2 %
KETONES UR-MCNC: NEGATIVE MG/DL
LDLC SERPL CALC-MCNC: 65 MG/DL (ref ?–100)
LEUKOCYTE ESTERASE UR QL STRIP.AUTO: 75
LYMPHOCYTES # BLD AUTO: 1.76 X10(3) UL (ref 1–4)
LYMPHOCYTES NFR BLD AUTO: 38.9 %
MCH RBC QN AUTO: 29.8 PG (ref 26–34)
MCHC RBC AUTO-ENTMCNC: 33.4 G/DL (ref 31–37)
MCV RBC AUTO: 89 FL
MONOCYTES # BLD AUTO: 0.44 X10(3) UL (ref 0.1–1)
MONOCYTES NFR BLD AUTO: 9.7 %
NEUTROPHILS # BLD AUTO: 2.18 X10 (3) UL (ref 1.5–7.7)
NEUTROPHILS # BLD AUTO: 2.18 X10(3) UL (ref 1.5–7.7)
NEUTROPHILS NFR BLD AUTO: 48.1 %
NITRITE UR QL STRIP.AUTO: NEGATIVE
NONHDLC SERPL-MCNC: 86 MG/DL (ref ?–130)
OSMOLALITY SERPL CALC.SUM OF ELEC: 293 MOSM/KG (ref 275–295)
PH UR: 7.5 [PH] (ref 5–8)
PLATELET # BLD AUTO: 172 10(3)UL (ref 150–450)
POTASSIUM SERPL-SCNC: 3.9 MMOL/L (ref 3.5–5.1)
PROT SERPL-MCNC: 7.5 G/DL (ref 5.7–8.2)
PROT UR-MCNC: NEGATIVE MG/DL
RBC # BLD AUTO: 4.47 X10(6)UL
SODIUM SERPL-SCNC: 142 MMOL/L (ref 136–145)
SP GR UR STRIP: 1.01 (ref 1–1.03)
TRIGL SERPL-MCNC: 117 MG/DL (ref 30–149)
TSI SER-ACNC: 1.36 MIU/ML (ref 0.55–4.78)
UROBILINOGEN UR STRIP-ACNC: NORMAL
VIT D+METAB SERPL-MCNC: 23.2 NG/ML (ref 30–100)
VLDLC SERPL CALC-MCNC: 18 MG/DL (ref 0–30)
WBC # BLD AUTO: 4.5 X10(3) UL (ref 4–11)

## 2024-05-07 PROCEDURE — 85025 COMPLETE CBC W/AUTO DIFF WBC: CPT

## 2024-05-07 PROCEDURE — 96160 PT-FOCUSED HLTH RISK ASSMT: CPT | Performed by: FAMILY MEDICINE

## 2024-05-07 PROCEDURE — 3008F BODY MASS INDEX DOCD: CPT | Performed by: FAMILY MEDICINE

## 2024-05-07 PROCEDURE — 3079F DIAST BP 80-89 MM HG: CPT | Performed by: FAMILY MEDICINE

## 2024-05-07 PROCEDURE — 99499 UNLISTED E&M SERVICE: CPT | Performed by: FAMILY MEDICINE

## 2024-05-07 PROCEDURE — 80053 COMPREHEN METABOLIC PANEL: CPT

## 2024-05-07 PROCEDURE — 87086 URINE CULTURE/COLONY COUNT: CPT

## 2024-05-07 PROCEDURE — 36415 COLL VENOUS BLD VENIPUNCTURE: CPT

## 2024-05-07 PROCEDURE — 1159F MED LIST DOCD IN RCRD: CPT | Performed by: FAMILY MEDICINE

## 2024-05-07 PROCEDURE — 83036 HEMOGLOBIN GLYCOSYLATED A1C: CPT

## 2024-05-07 PROCEDURE — 3075F SYST BP GE 130 - 139MM HG: CPT | Performed by: FAMILY MEDICINE

## 2024-05-07 PROCEDURE — 1126F AMNT PAIN NOTED NONE PRSNT: CPT | Performed by: FAMILY MEDICINE

## 2024-05-07 PROCEDURE — G0439 PPPS, SUBSEQ VISIT: HCPCS | Performed by: FAMILY MEDICINE

## 2024-05-07 PROCEDURE — 82378 CARCINOEMBRYONIC ANTIGEN: CPT

## 2024-05-07 PROCEDURE — 82306 VITAMIN D 25 HYDROXY: CPT

## 2024-05-07 PROCEDURE — 81001 URINALYSIS AUTO W/SCOPE: CPT

## 2024-05-07 PROCEDURE — 1170F FXNL STATUS ASSESSED: CPT | Performed by: FAMILY MEDICINE

## 2024-05-07 PROCEDURE — 80061 LIPID PANEL: CPT

## 2024-05-07 PROCEDURE — 84443 ASSAY THYROID STIM HORMONE: CPT

## 2024-05-07 RX ORDER — ASPIRIN 81 MG/1
81 TABLET ORAL DAILY
Qty: 90 TABLET | Refills: 1 | Status: SHIPPED | OUTPATIENT
Start: 2024-05-07

## 2024-05-07 RX ORDER — AMLODIPINE BESYLATE 5 MG/1
5 TABLET ORAL DAILY
Qty: 90 TABLET | Refills: 1 | Status: SHIPPED | OUTPATIENT
Start: 2024-05-07

## 2024-05-07 RX ORDER — DOCUSATE SODIUM 100 MG/1
100 CAPSULE, LIQUID FILLED ORAL 2 TIMES DAILY
Qty: 100 CAPSULE | Refills: 3 | Status: SHIPPED | OUTPATIENT
Start: 2024-05-07

## 2024-05-07 RX ORDER — ATORVASTATIN CALCIUM 20 MG/1
20 TABLET, FILM COATED ORAL NIGHTLY
Qty: 90 TABLET | Refills: 1 | Status: SHIPPED | OUTPATIENT
Start: 2024-05-07

## 2024-05-07 RX ORDER — OMEPRAZOLE 20 MG/1
20 CAPSULE, DELAYED RELEASE ORAL EVERY MORNING
Qty: 90 CAPSULE | Refills: 1 | Status: SHIPPED | OUTPATIENT
Start: 2024-05-07

## 2024-05-07 RX ORDER — VALSARTAN 160 MG/1
160 TABLET ORAL DAILY
Qty: 90 TABLET | Refills: 1 | Status: SHIPPED | OUTPATIENT
Start: 2024-05-07

## 2024-05-07 RX ORDER — CHLORTHALIDONE 25 MG/1
25 TABLET ORAL DAILY
Qty: 30 TABLET | Refills: 3 | Status: SHIPPED | OUTPATIENT
Start: 2024-05-07

## 2024-05-07 NOTE — PROGRESS NOTES
Subjective:   Amanda Avelar is a 79 year old female who presents for a Medicare Subsequent Annual Wellness visit (Pt already had Initial Annual Wellness) and scheduled follow up of multiple significant but stable problems.     Amanda Avelar is a 79 year old female present today for a routine periodic Medicare health complete physical examination.  Her last physical exam was 2 year(s) ago.  No LMP recorded. Patient has had a hysterectomy.       Amanda Avelar is G 4, P3, Ab 1.   She has a history of veneral infection significant for none.   She performs breast self-exams occationally .  Her last TDAP (orTD) booster was any years ago.  She is current with her influenza immunization. Already received her COVID vaccines  Her last Pap smear was 3 year(s) ago and was normal.  Her last mammogram was 1 year(s) ago and was normal.  Regarding colon cancer  screening test she underwent colonoscopy 1 year(s) ago, findings were normal.       History/Other:   Fall Risk Assessment:   She has been screened for Falls and is low risk.      Cognitive Assessment:   She had a completely normal cognitive assessment - see flowsheet entries       Functional Ability/Status:   Amanda Avelar has some abnormal functions as listed below:  She has Driving difficulties based on screening of functional status. She has difficulties Affording Meds based on screening of functional status.       Depression Screening (PHQ-2/PHQ-9): PHQ-2 SCORE: 0  , done 5/7/2024   Last [place] Suicide Screening on 5/7/2024 was No Risk.          Advanced Directives:   She does NOT have a Living Will. [Do you have a living will?: No]  She does NOT have a Power of  for Health Care. [Do you have a healthcare power of ?: No]  Discussed Advance Care Planning with patient (and family/surrogate if present). Standard forms made available to patient in After Visit Summary.      Patient Active Problem List   Diagnosis    History of cardiac cath    VENKATESH  (generalized anxiety disorder)    Other hyperlipidemia    Major depressive disorder in partial remission, unspecified whether recurrent (HCC)    Essential hypertension, benign    Colostomy care (HCC)    Recurrent UTI    History of rectal cancer    Abdominal bloating    Gastroesophageal reflux disease without esophagitis     Allergies:  She has No Known Allergies.    Current Medications:  Outpatient Medications Marked as Taking for the 5/7/24 encounter (Office Visit) with Kaushik Marroquin MD   Medication Sig    amLODIPine 5 MG Oral Tab Take 1 tablet (5 mg total) by mouth daily.    valsartan 160 MG Oral Tab Take 1 tablet (160 mg total) by mouth daily.    atorvastatin 20 MG Oral Tab Take 1 tablet (20 mg total) by mouth nightly.    aspirin (ASPIRIN 81) 81 MG Oral Tab EC Take 1 tablet (81 mg total) by mouth daily. Taking every 3 days    omeprazole 20 MG Oral Capsule Delayed Release Take 1 capsule (20 mg total) by mouth every morning.    diclofenac 1 % External Gel Apply 4 g topically 4 (four) times daily. Apply to affected area(s).    chlorthalidone 25 MG Oral Tab Take 1 tablet (25 mg total) by mouth daily.    docusate sodium 100 MG Oral Cap Take 1 capsule (100 mg total) by mouth 2 (two) times daily.       Medical History:  She  has a past medical history of CAD (coronary artery disease), Essential hypertension, Hemorrhoid, History of cardiac cath (6/13/2018), Hyperlipidemia, and Rectal cancer (HCC).  Surgical History:  She  has a past surgical history that includes cataract; hysterectomy; angioplasty (coronary); other; and colonoscopy (2018).   Family History:  Her family history includes Cancer in her father.  Social History:  She  reports that she has never smoked. She has never used smokeless tobacco. She reports that she does not drink alcohol and does not use drugs.    Tobacco:  She has never smoked tobacco.    CAGE Alcohol Screen:   CAGE screening score of 0 on 5/7/2024, showing low risk of alcohol  abuse.      Patient Care Team:  Kaushik Marroquin MD as PCP - General (Family Medicine)    Review of Systems   Constitutional:  Negative for appetite change, fatigue and fever.   HENT:  Negative for ear pain, hearing loss and nosebleeds.    Eyes:  Negative for visual disturbance.   Respiratory:  Negative for apnea, shortness of breath and wheezing.    Cardiovascular:  Negative for chest pain, palpitations and leg swelling.   Gastrointestinal:  Negative for abdominal pain, blood in stool, constipation, nausea and vomiting.        Colostomy    Endocrine: Negative for polydipsia and polyuria.   Genitourinary:  Negative for dyspareunia and hematuria.   Musculoskeletal:  Positive for arthralgias. Negative for back pain.   Skin:  Negative for rash.   Allergic/Immunologic: Negative for food allergies.   Neurological:  Negative for dizziness, syncope, light-headedness and headaches.   Psychiatric/Behavioral:  Negative for sleep disturbance.           Objective:   Physical Exam  Vitals reviewed.   Constitutional:       General: She is not in acute distress.     Appearance: Normal appearance.   HENT:      Head: Normocephalic.   Eyes:      Conjunctiva/sclera: Conjunctivae normal.   Cardiovascular:      Rate and Rhythm: Normal rate and regular rhythm.      Heart sounds: Normal heart sounds.   Pulmonary:      Effort: Pulmonary effort is normal.      Breath sounds: Normal breath sounds.   Abdominal:      Palpations: Abdomen is soft. There is no hepatomegaly or mass.      Tenderness: There is no abdominal tenderness.      Comments: + colostomy bag   Musculoskeletal:      Cervical back: Neck supple.   Lymphadenopathy:      Comments: LEs trace edema   Skin:     Findings: No rash.   Psychiatric:         Mood and Affect: Mood normal.            /82   Pulse 79   Ht 5' 3\" (1.6 m)   Wt 172 lb 2 oz (78.1 kg)   BMI 30.49 kg/m²  Estimated body mass index is 30.49 kg/m² as calculated from the following:    Height as of this  encounter: 5' 3\" (1.6 m).    Weight as of this encounter: 172 lb 2 oz (78.1 kg).    Medicare Hearing Assessment:   Hearing Screening    Time taken: 5/7/2024 11:25 AM  Entry User: Haven Hsieh MA  Screening Method: Questionnaire  I have a problem hearing over the telephone: No I have trouble following the conversations when two or more people are talking at the same time: No   I have trouble understanding things on the TV: No I have to strain to understand conversations: No   I have to worry about missing the telephone ring or doorbell: No I have trouble hearing conversations in a noisy background such as a crowded room or restaurant: No   I get confused about where sounds come from: No I misunderstand some words in a sentence and need to ask people to repeat themselves: No   I especially have trouble understanding the speech of women and children: No I have trouble understanding the speaker in a large room such as at a meeting or place of Restorationist: No   Many people I talk to seem to mumble (or don't speak clearly): No People get annoyed because I misunderstand what they say: No   I misunderstand what others are saying and make inappropriate responses: No I avoid social activities because I cannot hear well and fear I will reply improperly: No   Family members and friends have told me they think I may have hearing loss: No             Visual Acuity:   Right Eye Visual Acuity: Corrected     Left Eye Visual Acuity: Corrected Left Eye Chart Acuity: 20/60   Both Eyes Visual Acuity: Corrected Both Eyes Chart Acuity: 20/60   Able To Tolerate Visual Acuity: Yes        Assessment & Plan:   Amanda Avelar is a 79 year old female who presents for a Medicare Assessment.     1. Medicare annual wellness visit, subsequent (Primary)  2. Essential hypertension  -     Valsartan; Take 1 tablet (160 mg total) by mouth daily.  Dispense: 90 tablet; Refill: 1  -     Aspirin; Take 1 tablet (81 mg total) by mouth daily. Taking every 3  days  Dispense: 90 tablet; Refill: 1  -     CBC With Differential With Platelet; Future; Expected date: 05/07/2024  -     Comp Metabolic Panel (14); Future; Expected date: 05/07/2024  -     Urinalysis with Culture Reflex; Future; Expected date: 05/07/2024  3. Major depressive disorder in partial remission, unspecified whether recurrent (HCC)  4. History of rectal cancer  -     GASTRO - INTERNAL  -     CEA; Future; Expected date: 05/07/2024  5. Colostomy care ([facility])  6. Slow transit constipation  7. Gastroesophageal reflux disease without esophagitis  8. Primary osteoarthritis involving multiple joints  9. Osteopenia of lumbar spine  -     XR DEXA BONE DENSITOMETRY (CPT=77080); Future; Expected date: 05/07/2024  10. Pure hypercholesterolemia  -     Lipid Panel; Future; Expected date: 05/07/2024  -     TSH W Reflex To Free T4; Future; Expected date: 05/07/2024  11. Hyperglycemia  -     Hemoglobin A1C; Future; Expected date: 05/07/2024  12. Vitamin D deficiency  -     Vitamin D; Future; Expected date: 05/07/2024  13. Risk for falls  Other orders  -     amLODIPine Besylate; Take 1 tablet (5 mg total) by mouth daily.  Dispense: 90 tablet; Refill: 1  -     Atorvastatin Calcium; Take 1 tablet (20 mg total) by mouth nightly.  Dispense: 90 tablet; Refill: 1  -     Omeprazole; Take 1 capsule (20 mg total) by mouth every morning.  Dispense: 90 capsule; Refill: 1  -     Diclofenac Sodium; Apply 4 g topically 4 (four) times daily. Apply to affected area(s).  Dispense: 60 g; Refill: 2  -     Chlorthalidone; Take 1 tablet (25 mg total) by mouth daily.  Dispense: 30 tablet; Refill: 3  -     Docusate Sodium; Take 1 capsule (100 mg total) by mouth 2 (two) times daily.  Dispense: 100 capsule; Refill: 3    1. Medicare annual wellness visit, subsequent    Assessment and Plan:     Amanda Avelar Health checkup as follows:    LABORATORY & ORDERS:   Orders Placed This Encounter   Procedures    CBC With Differential With Platelet    Comp  Metabolic Panel (14)    Hemoglobin A1C    Lipid Panel    TSH W Reflex To Free T4    Vitamin D    Urinalysis with Culture Reflex    CEA [E]      REFERRALS: generated today : GASTRO - INTERNAL  XR DEXA BONE DENSITOMETRY (CPT=77080) .    IMMUNIZATIONS ordered and given today include: none.    RECOMMENDATIONS given include: ANTICIPATORY GUIDANCE  topics covered today include: safety (i.e. seat belts, helmets, sunscreen, protective sports gear ), nutrition (i.e. healthy meals and snacks (i.e. avoid junk food and high-carbohydrate foods); athletic conditioning, fluids; low fat milk, limit to less than 20 oz. a day; dental care with her dentist), and healthy habits & social competence & responsibilities: Recommendations on physical activity; exercise daily or at least 3 times a week for 30-60 minutes doing cardiovascular exercise. Patient educated on self breast examination to be done on a monthly basis.    REFUSALS:  Although recommended, the patient refuses the following: none .      FOLLOW-UP: Schedule a follow-up visit in 12 months.       2. Essential hypertension  Stable   CPM  Follow up [facility]  Lab:  - valsartan 160 MG Oral Tab; Take 1 tablet (160 mg total) by mouth daily.  Dispense: 90 tablet; Refill: 1  - aspirin (ASPIRIN 81) 81 MG Oral Tab EC; Take 1 tablet (81 mg total) by mouth daily. Taking every 3 days  Dispense: 90 tablet; Refill: 1  - CBC With Differential With Platelet; Future  - Comp Metabolic Panel (14); Future  - Urinalysis with Culture Reflex; Future    3. Major depressive disorder in partial remission, unspecified whether recurrent (HCC)  Stable   CPM  Follow up [facility]    4. History of rectal cancer  Doing well     Referral and tests:  - GASTRO - INTERNAL  - CEA [E]; Future    5. Colostomy care ([facility])  CPM    6. Slow transit constipation  Doing well  CPM    7. Gastroesophageal reflux disease without esophagitis  Stable   CPM  Follow up [facility]    8. Primary osteoarthritis involving multiple joints    Rx  Acetaminophen prn    9. Osteopenia of lumbar spine  Referral:   - XR DEXA BONE DENSITOMETRY (CPT=77080); Future    10. Pure hypercholesterolemia  Stable   CPM  Follow up [facility]  Lab:  - Lipid Panel; Future  - TSH W Reflex To Free T4; Future    11. Hyperglycemia  Lab:  Hemoglobin A1C; Future    12. Vitamin D deficiency  Lab:  Vitamin D; Future    13. Risk for falls  Mild risk, precautions given     The patient indicates understanding of these issues and agrees to the plan.  Consult ordered.  Further testing ordered.  Imaging studies ordered.  Lab work ordered.  Reinforced healthy diet, lifestyle, and exercise.      Return in about 6 months (around 11/7/2024).     WILLIAM BHATTI MD, 5/7/2024     Supplementary Documentation:   General Health:  In the past six months, have you lost more than 10 pounds without trying?: 2 - No  Has your appetite been poor?: No  Type of Diet: Low Salt  How does the patient maintain a good energy level?: Daily Walks  How would you describe your daily physical activity?: Light  How would you describe your current health state?: Fair  How do you maintain positive mental well-being?: Visiting Friends;Visiting Family  On a scale of 0 to 10, with 0 being no pain and 10 being severe pain, what is your pain level?: 0 - (None)  In the past six months, have you experienced urine leakage?: 0-No  At any time do you feel concerned for the safety/well-being of yourself and/or your children, in your home or elsewhere?: No  Have you had any immunizations at another office such as Influenza, Hepatitis B, Tetanus, or Pneumococcal?: No       Amanda Avelar's SCREENING SCHEDULE   Tests on this list are recommended by your physician but may not be covered, or covered at this frequency, by your insurer.   Please check with your insurance carrier before scheduling to verify coverage.   PREVENTATIVE SERVICES FREQUENCY &  COVERAGE DETAILS LAST COMPLETION DATE   Diabetes Screening    Fasting Blood Sugar /  Glucose     One screening every 12 months if never tested or if previously tested but not diagnosed with pre-diabetes   One screening every 6 months if diagnosed with pre-diabetes Lab Results   Component Value Date    GLU 98 03/30/2022        Cardiovascular Disease Screening    Lipid Panel  Cholesterol  Lipoprotein (HDL)  Triglycerides Covered every 5 years for all Medicare beneficiaries without apparent signs or symptoms of cardiovascular disease Lab Results   Component Value Date    CHOLEST 226 (H) 03/30/2022    HDL 59 03/30/2022     (H) 03/30/2022    TRIG 76 03/30/2022         Electrocardiogram (EKG)   Covered if needed at Welcome to Medicare, and non-screening if indicated for medical reasons 03/30/2022      Ultrasound Screening for Abdominal Aortic Aneurysm (AAA) Covered once in a lifetime for one of the following risk factors    Men who are 65-75 years old and have ever smoked    Anyone with a family history -     Colorectal Cancer Screening  Covered for ages 50-85; only need ONE of the following:    Colonoscopy   Covered every 10 years    Covered every 2 years if patient is at high risk or previous colonoscopy was abnormal -    No recommendations at this time    Flexible Sigmoidoscopy   Covered every 4 years -    Fecal Occult Blood Test Covered annually -   Bone Density Screening    Bone density screening    Covered every 2 years after age 65 if diagnosed with risk of osteoporosis or estrogen deficiency.    Covered yearly for long-term glucocorticoid medication use (Steroids) No results found for this or any previous visit.      Health Maintenance Due   Topic Date Due    DEXA Scan  Never done      Pap and Pelvic    Pap   Covered every 2 years for women at normal risk; Annually if at high risk -  No recommendations at this time    Chlamydia Annually if high risk -  No recommendations at this time   Screening Mammogram    Mammogram     Recommend annually for all female patients aged 40 and older    One baseline  mammogram covered for patients aged 35-39 04/07/2022    No recommendations at this time    Immunizations    Influenza Covered once per flu season  Please get every year -  No recommendations at this time    Pneumococcal Each vaccine ([id number] & [id number]) covered once after 65 Prevnar 13: 06/13/2018    [id number]: 01/30/2013     No recommendations at this time    Hepatitis B One screening covered for patients with certain risk factors   -  No recommendations at this time    Tetanus Toxoid Not covered by Medicare Part B unless medically necessary (cut with metal); may be covered with your pharmacy prescription benefits -    Tetanus, Diptheria and Pertusis TD and TDaP Not covered by Medicare Part B -  No recommendations at this time    Zoster Not covered by Medicare Part B; may be covered with your pharmacy  prescription benefits -  Zoster Vaccines(1 of 2) Never done     Annual Monitoring of Persistent Medications (ACE/ARB, digoxin diuretics, anticonvulsants)    Potassium Annually Lab Results   Component Value Date    K 4.7 03/30/2022         Creatinine   Annually Lab Results   Component Value Date    CREATSERUM 0.82 03/30/2022         BUN Annually Lab Results   Component Value Date    BUN 19 (H) 03/30/2022       Drug Serum Conc Annually No results found for: \"DIGOXIN\", \"DIG\", \"VALP\"

## 2024-05-08 RX ORDER — ERGOCALCIFEROL 1.25 MG/1
50000 CAPSULE ORAL WEEKLY
Qty: 12 CAPSULE | Refills: 3 | Status: SHIPPED | OUTPATIENT
Start: 2024-05-08 | End: 2025-05-08

## 2024-05-14 ENCOUNTER — OFFICE VISIT (OUTPATIENT)
Dept: FAMILY MEDICINE CLINIC | Facility: CLINIC | Age: 79
End: 2024-05-14

## 2024-05-14 VITALS
DIASTOLIC BLOOD PRESSURE: 79 MMHG | HEART RATE: 70 BPM | HEIGHT: 63 IN | SYSTOLIC BLOOD PRESSURE: 152 MMHG | BODY MASS INDEX: 30.37 KG/M2 | WEIGHT: 171.38 LBS

## 2024-05-14 DIAGNOSIS — E11.9 TYPE 2 DIABETES MELLITUS WITHOUT COMPLICATION, WITHOUT LONG-TERM CURRENT USE OF INSULIN (HCC): Primary | ICD-10-CM

## 2024-05-14 PROCEDURE — 1126F AMNT PAIN NOTED NONE PRSNT: CPT | Performed by: FAMILY MEDICINE

## 2024-05-14 PROCEDURE — 3078F DIAST BP <80 MM HG: CPT | Performed by: FAMILY MEDICINE

## 2024-05-14 PROCEDURE — 99214 OFFICE O/P EST MOD 30 MIN: CPT | Performed by: FAMILY MEDICINE

## 2024-05-14 PROCEDURE — 3008F BODY MASS INDEX DOCD: CPT | Performed by: FAMILY MEDICINE

## 2024-05-14 PROCEDURE — 3077F SYST BP >= 140 MM HG: CPT | Performed by: FAMILY MEDICINE

## 2024-05-14 RX ORDER — SEMAGLUTIDE 0.68 MG/ML
0.25 INJECTION, SOLUTION SUBCUTANEOUS WEEKLY
Qty: 1 EACH | Refills: 5 | Status: SHIPPED | OUTPATIENT
Start: 2024-05-14

## 2024-05-14 RX ORDER — BLOOD SUGAR DIAGNOSTIC
STRIP MISCELLANEOUS
Qty: 100 STRIP | Refills: 3 | Status: SHIPPED | OUTPATIENT
Start: 2024-05-14

## 2024-05-14 RX ORDER — BLOOD-GLUCOSE METER
EACH MISCELLANEOUS
Qty: 1 KIT | Refills: 0 | Status: SHIPPED | OUTPATIENT
Start: 2024-05-14

## 2024-05-14 RX ORDER — LANCETS
EACH MISCELLANEOUS
Qty: 100 EACH | Refills: 3 | Status: SHIPPED | OUTPATIENT
Start: 2024-05-14

## 2024-05-14 NOTE — PROGRESS NOTES
5/14/2024  3:38 PM    Amanda Avelar is a 79 year old female.    Chief complaint(s):   Chief Complaint   Patient presents with    Test Results     A1c      HPI:     Amanda Avelar primary complaint is regarding new onset diabetes.     Patient Amanda Avelar is a 79 year old female is here to be evaluated for type 2 diabetes.  Specifically, female has type 2, insulin none requiring diabetes. Compliance with treatment has been fair.  Patient's diabetes was first diagnosed 2024.  Patient follows a 2000 calorie ADA diet.  Patient report experiencing the following diabetes related symptoms; Negative for polyuria, Negative for polydipsia, Negative for blurred vision.  Depression symptoms include none.  Tobacco screen: none  smoker.  Current meds include :  oral hypoglycemic include: NONE  insulin/injectable : -   GLP-1 agonist : -  Statins medication: Atorvastatin 20 mg  Hypoglycemia episodes is not applicable. she reports home blood glucose readings have been ? (#s) and believes  having fair glucose control.  Most recent lab results include glycohemoglobin 6.5 %, microalbuminuria have been -. Last GFR was 68.   In regard to preventative care, her last ophthalmology exam was in > 12 months ago.  Opthalmic evaluation have shown no pathology.  Concurrent relative health problems include HTN.         HISTORY:  Past Medical History:    CAD (coronary artery disease)    Essential hypertension    Hemorrhoid    History of cardiac cath    Hyperlipidemia    Rectal cancer (HCC)      Past Surgical History:   Procedure Laterality Date    Angioplasty (coronary)      Cataract      Colonoscopy  2018    [place]    Hysterectomy      Other      Stoma with colostomy bag      Family History   Problem Relation Age of Onset    Cancer Father         lung cancer      Social History:   Social History     Socioeconomic History    Marital status:    Tobacco Use    Smoking status: Never    Smokeless tobacco: Never   Vaping Use    Vaping  status: Never Used   Substance and Sexual Activity    Alcohol use: No    Drug use: No        Immunizations:   Immunization History   Administered Date(s) Administered    FLU VAC High Dose 65 YRS & Older PRSV Free (95923) 10/29/2020, 11/01/2021, 09/29/2022    HIGH DOSE FLU 65 YRS AND OLDER PRSV FREE SINGLE D (87031) FLU CLINIC 09/17/2015, 10/13/2016    Pneumococcal (Prevnar 13) 06/13/2018    Pneumococcal Conjugate PCV20 09/29/2022    Pneumovax 23 01/30/2013       Medications (Active prior to today's visit):  Current Outpatient Medications   Medication Sig Dispense Refill    ergocalciferol 1.25 MG (43015 UT) Oral Cap Take 1 capsule (50,000 Units total) by mouth once a week. 12 capsule 3    amLODIPine 5 MG Oral Tab Take 1 tablet (5 mg total) by mouth daily. 90 tablet 1    valsartan 160 MG Oral Tab Take 1 tablet (160 mg total) by mouth daily. 90 tablet 1    atorvastatin 20 MG Oral Tab Take 1 tablet (20 mg total) by mouth nightly. 90 tablet 1    aspirin (ASPIRIN 81) 81 MG Oral Tab EC Take 1 tablet (81 mg total) by mouth daily. Taking every 3 days 90 tablet 1    omeprazole 20 MG Oral Capsule Delayed Release Take 1 capsule (20 mg total) by mouth every morning. 90 capsule 1    diclofenac 1 % External Gel Apply 4 g topically 4 (four) times daily. Apply to affected area(s). 60 g 2    chlorthalidone 25 MG Oral Tab Take 1 tablet (25 mg total) by mouth daily. 30 tablet 3    docusate sodium 100 MG Oral Cap Take 1 capsule (100 mg total) by mouth 2 (two) times daily. 100 capsule 3    olopatadine (PATADAY) 0.1 % Ophthalmic Solution Place 1 drop into both eyes 2 (two) times daily. 5 mL 2    Citalopram Hydrobromide 20 MG Oral Tab Take 1 tablet (20 mg total) by mouth daily. (Patient not taking: Reported on 10/21/2023) 90 tablet 0       Allergies:  No Known Allergies      ROS:   Review of Systems   Constitutional:  Positive for unexpected weight change (gain). Negative for appetite change and fever.   Eyes:  Negative for visual  disturbance.   Respiratory:  Negative for shortness of breath.    Cardiovascular:  Negative for chest pain.   Gastrointestinal:  Negative for abdominal pain, nausea and vomiting.   Endocrine: Negative for polydipsia and polyuria.   Musculoskeletal:  Negative for back pain.   Skin:  Negative for rash.   Neurological:  Negative for dizziness and headaches.       PHYSICAL EXAM:   VS: /79   Pulse 70   Ht 5' 3\" (1.6 m)   Wt 171 lb 6.4 oz (77.7 kg)   BMI 30.36 kg/m²     Physical Exam  Vitals reviewed.   Constitutional:       General: She is not in acute distress.     Appearance: Normal appearance.   HENT:      Head: Normocephalic.   Eyes:      Conjunctiva/sclera: Conjunctivae normal.   Cardiovascular:      Rate and Rhythm: Normal rate.   Pulmonary:      Effort: Pulmonary effort is normal.   Musculoskeletal:      Cervical back: Neck supple.   Skin:     Findings: No rash.   Psychiatric:         Mood and Affect: Mood normal.         LABORATORY RESULTS:   No results found for: \"URCOLOR\", \"URCLA\", \"URINELEUK\", \"URINENITRITE\", \"URINEBLOOD\"   Results for orders placed or performed in visit on 05/07/24   Comp Metabolic Panel (14)   Result Value Ref Range    Glucose 92 70 - 99 mg/dL    Sodium 142 136 - 145 mmol/L    Potassium 3.9 3.5 - 5.1 mmol/L    Chloride 107 98 - 112 mmol/L    CO2 30.0 21.0 - 32.0 mmol/L    Anion Gap 5 0 - 18 mmol/L    BUN 12 9 - 23 mg/dL    Creatinine 0.87 0.55 - 1.02 mg/dL    BUN/CREA Ratio 13.8 10.0 - 20.0    Calcium, Total 9.3 8.7 - 10.4 mg/dL    Calculated Osmolality 293 275 - 295 mOsm/kg    eGFR-Cr 68 >=60 mL/min/1.73m2    ALT 12 10 - 49 U/L    AST 20 <=34 U/L    Alkaline Phosphatase 51 (L) 55 - 142 U/L    Bilirubin, Total 0.6 0.2 - 1.1 mg/dL    Total Protein 7.5 5.7 - 8.2 g/dL    Albumin 4.3 3.2 - 4.8 g/dL    Globulin  3.2 2.0 - 3.5 g/dL    A/G Ratio 1.3 1.0 - 2.0    Patient Fasting for CMP? Yes    Hemoglobin A1C   Result Value Ref Range    HgbA1C 6.5 (H) <5.7 %    Estimated Average Glucose  140 (H) 68 - 126 mg/dL   Lipid Panel   Result Value Ref Range    Cholesterol, Total 142 <200 mg/dL    HDL Cholesterol 56 40 - 59 mg/dL    Triglycerides 117 30 - 149 mg/dL    LDL Cholesterol 65 <100 mg/dL    VLDL 18 0 - 30 mg/dL    Non HDL Chol 86 <130 mg/dL    Patient Fasting for Lipid? Yes    TSH W Reflex To Free T4   Result Value Ref Range    TSH 1.364 0.550 - 4.780 mIU/mL   CEA [E]   Result Value Ref Range    CEA  1.2 <=5.0 ng/mL   Vitamin D, 25-Hydroxy   Result Value Ref Range    Vitamin D, 25OH, Total 23.2 (L) 30.0 - 100.0 ng/mL   Urinalysis with Culture Reflex    Specimen: Urine   Result Value Ref Range    Urine Color Colorless (A) Yellow    Clarity Urine Clear Clear    Spec Gravity 1.007 1.005 - 1.030    Glucose Urine Normal Normal mg/dL    Bilirubin Urine Negative Negative    Ketones Urine Negative Negative mg/dL    Blood Urine Negative Negative    pH Urine 7.5 5.0 - 8.0    Protein Urine Negative Negative mg/dL    Urobilinogen Urine Normal Normal    Nitrite Urine Negative Negative    Leukocyte Esterase Urine 75 (A) Negative    WBC Urine 1-5 0 - 5 /HPF    RBC Urine 0-2 0 - 2 /HPF    Bacteria Urine 2+ (A) None Seen /HPF    Squamous Epi. Cells Few (A) None Seen /HPF    Renal Tubular Epithelial Cells None Seen None Seen /HPF    Transitional Cells None Seen None Seen /HPF    Yeast Urine None Seen None Seen /HPF   Urine Culture, Routine    Specimen: Urine, clean catch   Result Value Ref Range    Urine Culture       10-50,000 cfu/ml Multiple species present-probable contamination.   CBC W/ DIFFERENTIAL   Result Value Ref Range    WBC 4.5 4.0 - 11.0 x10(3) uL    RBC 4.47 3.80 - 5.30 x10(6)uL    HGB 13.3 12.0 - 16.0 g/dL    HCT 39.8 35.0 - 48.0 %    MCV 89.0 80.0 - 100.0 fL    MCH 29.8 26.0 - 34.0 pg    MCHC 33.4 31.0 - 37.0 g/dL    RDW-SD 44.5 35.1 - 46.3 fL    RDW 13.7 11.0 - 15.0 %    .0 150.0 - 450.0 10(3)uL    Neutrophil Absolute Prelim 2.18 1.50 - 7.70 x10 (3) uL    Neutrophil Absolute 2.18 1.50 - 7.70  x10(3) uL    Lymphocyte Absolute 1.76 1.00 - 4.00 x10(3) uL    Monocyte Absolute 0.44 0.10 - 1.00 x10(3) uL    Eosinophil Absolute 0.11 0.00 - 0.70 x10(3) uL    Basophil Absolute 0.03 0.00 - 0.20 x10(3) uL    Immature Granulocyte Absolute 0.01 0.00 - 1.00 x10(3) uL    Neutrophil % 48.1 %    Lymphocyte % 38.9 %    Monocyte % 9.7 %    Eosinophil % 2.4 %    Basophil % 0.7 %    Immature Granulocyte % 0.2 %     EKG / Spirometry : -     Radiology: No results found.     ASSESSMENT/PLAN:   Assessment   No diagnosis found.    DIABETES A&P    LABORATORY & ORDERS: Blood test(s) ordered today ; No orders of the defined types were placed in this encounter.    Additional orders include:   MEDICATIONS:    semaglutide (OZEMPIC, 0.25 OR 0.5 MG/DOSE,) 2 MG/3ML Subcutaneous Solution Pen-injector, Inject 0.25 mg into the skin once a week., Disp: 1 each, Rfl: 5    Accu-Chek Softclix Lancets Does not apply Misc, Use Q day, Disp: 100 each, Rfl: 3    Blood Glucose Monitoring Suppl (ACCU-CHEK GUIDE) w/Device Does not apply Kit, Use Q day, Disp: 1 kit, Rfl: 0    Glucose Blood (ACCU-CHEK GUIDE) In Vitro Strip, Use Q day, Disp: 100 strip, Rfl: 3    ergocalciferol 1.25 MG (97169 UT) Oral Cap, Take 1 capsule (50,000 Units total) by mouth once a week., Disp: 12 capsule, Rfl: 3    amLODIPine 5 MG Oral Tab, Take 1 tablet (5 mg total) by mouth daily., Disp: 90 tablet, Rfl: 1    valsartan 160 MG Oral Tab, Take 1 tablet (160 mg total) by mouth daily., Disp: 90 tablet, Rfl: 1    atorvastatin 20 MG Oral Tab, Take 1 tablet (20 mg total) by mouth nightly., Disp: 90 tablet, Rfl: 1    aspirin (ASPIRIN 81) 81 MG Oral Tab EC, Take 1 tablet (81 mg total) by mouth daily. Taking every 3 days, Disp: 90 tablet, Rfl: 1    omeprazole 20 MG Oral Capsule Delayed Release, Take 1 capsule (20 mg total) by mouth every morning., Disp: 90 capsule, Rfl: 1    diclofenac 1 % External Gel, Apply 4 g topically 4 (four) times daily. Apply to affected area(s)., Disp: 60 g, Rfl:  2    chlorthalidone 25 MG Oral Tab, Take 1 tablet (25 mg total) by mouth daily., Disp: 30 tablet, Rfl: 3    docusate sodium 100 MG Oral Cap, Take 1 capsule (100 mg total) by mouth 2 (two) times daily., Disp: 100 capsule, Rfl: 3    olopatadine (PATADAY) 0.1 % Ophthalmic Solution, Place 1 drop into both eyes 2 (two) times daily., Disp: 5 mL, Rfl: 2    Citalopram Hydrobromide 20 MG Oral Tab, Take 1 tablet (20 mg total) by mouth daily. (Patient not taking: Reported on 10/21/2023), Disp: 90 tablet, Rfl: 0.  Requested Prescriptions     Signed Prescriptions Disp Refills    semaglutide (OZEMPIC, 0.25 OR 0.5 MG/DOSE,) 2 MG/3ML Subcutaneous Solution Pen-injector 1 each 5     Sig: Inject 0.25 mg into the skin once a week.    Accu-Chek Softclix Lancets Does not apply Misc 100 each 3     Sig: Use Q day    Blood Glucose Monitoring Suppl (ACCU-CHEK GUIDE) w/Device Does not apply Kit 1 kit 0     Sig: Use Q day    Glucose Blood (ACCU-CHEK GUIDE) In Vitro Strip 100 strip 3     Sig: Use Q day      REFERRALS:       Procedures    DIABETIC EDUCATION - INTERNAL     RECOMMENDATIONS: instructed in use of glucometer ( check fasting glucose rarely), return for training in administering insulin injections, adherence to an 1800 calorie ADA diet,  5 pound weight loss, a graduated exercise program, HgbA1C level checked quarterly, daily foot self-inspection, need for yearly flu shots, and avoid all sodas, juices, candy, chocolates, cakes, ice cream, etc.      FOLLOW-UP: Schedule a follow-up visit in 6 months.       COUNSELING: The patient was counseled concerning the relationship between diabetes control and macrovascular disease including cardiovascular, cerebrovascular and peripheral vascular disease. The patient was counseled concerning the relationship between diabetes control and retinopathy, nephropathy, and neuropathy. Advised as to the targets of pre-meal glucoses ( mg/dl) and post meal glucoses (<140-160 mg/dl) Home glucose testing  discussed. The A1c target of <7% according to ADA and <6.5% according to AACE were discussed.           Orders This Visit:  No orders of the defined types were placed in this encounter.      Meds This Visit:  Requested Prescriptions      No prescriptions requested or ordered in this encounter       Imaging & Referrals:  None         WILLIAM BHATTI MD

## 2024-05-20 ENCOUNTER — TELEPHONE (OUTPATIENT)
Dept: FAMILY MEDICINE CLINIC | Facility: CLINIC | Age: 79
End: 2024-05-20

## 2024-05-20 NOTE — TELEPHONE ENCOUNTER
Completed on covermymeds     Information regarding your request  Available without authorization.

## 2024-08-08 ENCOUNTER — TELEPHONE (OUTPATIENT)
Dept: FAMILY MEDICINE CLINIC | Facility: CLINIC | Age: 79
End: 2024-08-08

## 2024-08-08 NOTE — TELEPHONE ENCOUNTER
Left message, please inform of diabetic supplies last refilled in May for a whole year. Patient will need to contact the pharmacy.

## 2024-08-08 NOTE — TELEPHONE ENCOUNTER
Patients brother in law Eliazar calling for diabetic supplies that are needed every 3 months. Please send to [facility].

## 2024-08-09 ENCOUNTER — TELEPHONE (OUTPATIENT)
Dept: FAMILY MEDICINE CLINIC | Facility: CLINIC | Age: 79
End: 2024-08-09

## 2024-08-09 DIAGNOSIS — Z43.3 COLOSTOMY CARE (HCC): ICD-10-CM

## 2024-08-09 DIAGNOSIS — C21.8 MALIGNANT NEOPLASM OF ANORECTUM (HCC): Primary | ICD-10-CM

## 2024-08-09 NOTE — TELEPHONE ENCOUNTER
Eliazar was called and he stated that he did not call for Diabetic supplies see other encounter.   He stated that patient needs  colostomy bags and cleaning supplies. That [facility] needs for Dr. Marroquin to approve this. So [facility] can sent patient the supplies. Eliazar will call us back later to provide us more information on what is needed or provide us with a fax number to where to sent the order or referral request.

## 2024-08-09 NOTE — TELEPHONE ENCOUNTER
Triage Support can you please assist?    Eliazar called back, states he doesn't have the fax number [facility].     Nurse called [phone]  spoke to Ml at [employer].  They spoke to Eliazar yesterday told him they did not receive the authorization from [facility] from the last order they received (was in May 15, 2024) they need a new order and authorization.          Fax number [phone] for [facility].

## 2024-08-09 NOTE — TELEPHONE ENCOUNTER
Spoke with rep Salmeron from [employer] at [phone].  He stated to enter with supply number and quantity and fax out to [phone].       Supply           Item #    quantity   Colostomy pouches =  15603    6 boxes/90 day supply  Barriers =                     26558  12 boxes/90 day supply   Deoderant =                  85226   3 boxes/90 day supply  Powder =                         7906   6 bottles/90 day supply  Barrier spray =               31047   3 bottles/90 day supply  Adhesive remover =      834742  6 boxes/90 day supply  Paste =                          [id number]  3 bottles/90 day supply      Order pended  Please sign if appropriate

## 2024-12-19 ENCOUNTER — TELEPHONE (OUTPATIENT)
Dept: FAMILY MEDICINE CLINIC | Facility: CLINIC | Age: 79
End: 2024-12-19

## 2024-12-19 NOTE — TELEPHONE ENCOUNTER
Unable to reach patient for medication adherence consult via  ID# [id number]. LVM for patient to call me back at [phone].

## 2025-02-06 ENCOUNTER — TELEPHONE (OUTPATIENT)
Dept: FAMILY MEDICINE CLINIC | Facility: CLINIC | Age: 80
End: 2025-02-06

## 2025-02-06 NOTE — TELEPHONE ENCOUNTER
Unable to reach patient for medication adherence consult via  ID# [id number]. LVM for patient to call me back at [phone].

## 2025-06-12 ENCOUNTER — TELEPHONE (OUTPATIENT)
Dept: FAMILY MEDICINE CLINIC | Facility: CLINIC | Age: 80
End: 2025-06-12

## 2025-07-17 ENCOUNTER — TELEPHONE (OUTPATIENT)
Dept: FAMILY MEDICINE CLINIC | Facility: CLINIC | Age: 80
End: 2025-07-17

## (undated) DIAGNOSIS — K86.9 PANCREATIC LESION: Primary | ICD-10-CM

## (undated) DIAGNOSIS — R94.31 ABNORMAL EKG: Primary | ICD-10-CM

## (undated) NOTE — LETTER
04/02/21        Amanda [place] [address]      Estimado Meena Nava,    Nuestros registros indican que tiene análisis clínicos pendientes y/o pruebas que se ordenaron en [place] nombre que no se christine completado.      MRI MR

## (undated) NOTE — LETTER
3/1/2021              [address] [place]         Dear Nadeem Krueger,    This letter is to inform you that our office has made several attempts to reach you by phone without success.   We were attempting to cont